# Patient Record
Sex: FEMALE | URBAN - METROPOLITAN AREA
[De-identification: names, ages, dates, MRNs, and addresses within clinical notes are randomized per-mention and may not be internally consistent; named-entity substitution may affect disease eponyms.]

---

## 2024-06-13 ENCOUNTER — CLAIMS CREATED FROM THE CLAIM WINDOW (OUTPATIENT)
Dept: URBAN - METROPOLITAN AREA MEDICAL CENTER 28 | Facility: MEDICAL CENTER | Age: 59
End: 2024-06-13
Payer: SELF-PAY

## 2024-06-13 DIAGNOSIS — K92.0 BLOODY EMESIS: ICD-10-CM

## 2024-06-13 DIAGNOSIS — D64.89 ANEMIA DUE TO OTHER CAUSE: ICD-10-CM

## 2024-06-13 DIAGNOSIS — K74.69 CIRRHOSIS, CRYPTOGENIC: ICD-10-CM

## 2024-06-13 DIAGNOSIS — D62 ABLA (ACUTE BLOOD LOSS ANEMIA): ICD-10-CM

## 2024-06-13 DIAGNOSIS — K20.80 ABSCESS OF ESOPHAGUS: ICD-10-CM

## 2024-06-13 DIAGNOSIS — R79.1 ABNORMAL BLEEDING TIME: ICD-10-CM

## 2024-06-13 PROCEDURE — 99255 IP/OBS CONSLTJ NEW/EST HI 80: CPT | Performed by: INTERNAL MEDICINE

## 2024-06-13 PROCEDURE — 43235 EGD DIAGNOSTIC BRUSH WASH: CPT | Performed by: INTERNAL MEDICINE

## 2024-06-14 ENCOUNTER — CLAIMS CREATED FROM THE CLAIM WINDOW (OUTPATIENT)
Dept: URBAN - METROPOLITAN AREA MEDICAL CENTER 28 | Facility: MEDICAL CENTER | Age: 59
End: 2024-06-14
Payer: SELF-PAY

## 2024-06-14 DIAGNOSIS — D62 ABLA (ACUTE BLOOD LOSS ANEMIA): ICD-10-CM

## 2024-06-14 DIAGNOSIS — K92.2 ACUTE GASTROINTESTINAL BLEEDING: ICD-10-CM

## 2024-06-14 DIAGNOSIS — I85.00 DOWNHILL ESOPHAGEAL VARICES: ICD-10-CM

## 2024-06-14 PROCEDURE — 43255 EGD CONTROL BLEEDING ANY: CPT | Performed by: INTERNAL MEDICINE

## 2024-06-14 PROCEDURE — 43235 EGD DIAGNOSTIC BRUSH WASH: CPT | Performed by: INTERNAL MEDICINE

## 2024-06-15 ENCOUNTER — CLAIMS CREATED FROM THE CLAIM WINDOW (OUTPATIENT)
Dept: URBAN - METROPOLITAN AREA MEDICAL CENTER 28 | Facility: MEDICAL CENTER | Age: 59
End: 2024-06-15
Payer: SELF-PAY

## 2024-06-15 DIAGNOSIS — K92.2 ACUTE GASTROINTESTINAL BLEEDING: ICD-10-CM

## 2024-06-15 PROCEDURE — 99232 SBSQ HOSP IP/OBS MODERATE 35: CPT | Performed by: INTERNAL MEDICINE

## 2024-06-16 ENCOUNTER — CLAIMS CREATED FROM THE CLAIM WINDOW (OUTPATIENT)
Dept: URBAN - METROPOLITAN AREA MEDICAL CENTER 28 | Facility: MEDICAL CENTER | Age: 59
End: 2024-06-16
Payer: SELF-PAY

## 2024-06-16 DIAGNOSIS — K92.2 ACUTE GASTROINTESTINAL BLEEDING: ICD-10-CM

## 2024-06-16 PROCEDURE — 99232 SBSQ HOSP IP/OBS MODERATE 35: CPT | Performed by: INTERNAL MEDICINE

## 2024-06-17 ENCOUNTER — CLAIMS CREATED FROM THE CLAIM WINDOW (OUTPATIENT)
Dept: URBAN - METROPOLITAN AREA MEDICAL CENTER 28 | Facility: MEDICAL CENTER | Age: 59
End: 2024-06-17
Payer: SELF-PAY

## 2024-06-17 DIAGNOSIS — K92.2 ACUTE GASTROINTESTINAL BLEEDING: ICD-10-CM

## 2024-06-17 DIAGNOSIS — D64.89 ANEMIA DUE TO OTHER CAUSE: ICD-10-CM

## 2024-06-17 DIAGNOSIS — F10.10 AA (ALCOHOL ABUSE): ICD-10-CM

## 2024-06-17 PROCEDURE — 99233 SBSQ HOSP IP/OBS HIGH 50: CPT | Performed by: INTERNAL MEDICINE

## 2024-06-18 ENCOUNTER — CLAIMS CREATED FROM THE CLAIM WINDOW (OUTPATIENT)
Dept: URBAN - METROPOLITAN AREA MEDICAL CENTER 28 | Facility: MEDICAL CENTER | Age: 59
End: 2024-06-18
Payer: SELF-PAY

## 2024-06-18 DIAGNOSIS — F10.10 AA (ALCOHOL ABUSE): ICD-10-CM

## 2024-06-18 DIAGNOSIS — K92.0 BLOODY EMESIS: ICD-10-CM

## 2024-06-18 DIAGNOSIS — K92.1 ACUTE MELENA: ICD-10-CM

## 2024-06-18 PROCEDURE — 99232 SBSQ HOSP IP/OBS MODERATE 35: CPT | Performed by: INTERNAL MEDICINE

## 2024-06-19 ENCOUNTER — CLAIMS CREATED FROM THE CLAIM WINDOW (OUTPATIENT)
Dept: URBAN - METROPOLITAN AREA MEDICAL CENTER 28 | Facility: MEDICAL CENTER | Age: 59
End: 2024-06-19
Payer: SELF-PAY

## 2024-06-19 DIAGNOSIS — K92.2 ACUTE GASTROINTESTINAL BLEEDING: ICD-10-CM

## 2024-06-19 DIAGNOSIS — R79.89 ABNORMAL BILIRUBIN TEST: ICD-10-CM

## 2024-06-19 DIAGNOSIS — R79.1 ABNORMAL BLEEDING TIME: ICD-10-CM

## 2024-06-19 DIAGNOSIS — K76.0 FATTY (CHANGE OF) LIVER: ICD-10-CM

## 2024-06-19 PROCEDURE — 99232 SBSQ HOSP IP/OBS MODERATE 35: CPT | Performed by: INTERNAL MEDICINE

## 2024-06-20 ENCOUNTER — CLAIMS CREATED FROM THE CLAIM WINDOW (OUTPATIENT)
Dept: URBAN - METROPOLITAN AREA MEDICAL CENTER 28 | Facility: MEDICAL CENTER | Age: 59
End: 2024-06-20
Payer: SELF-PAY

## 2024-06-20 DIAGNOSIS — R93.3 ABN FINDINGS-GI TRACT: ICD-10-CM

## 2024-06-20 DIAGNOSIS — K76.0 FATTY (CHANGE OF) LIVER: ICD-10-CM

## 2024-06-20 DIAGNOSIS — K92.2 ACUTE GASTROINTESTINAL BLEEDING: ICD-10-CM

## 2024-06-20 PROCEDURE — 99232 SBSQ HOSP IP/OBS MODERATE 35: CPT | Performed by: INTERNAL MEDICINE

## 2024-06-21 ENCOUNTER — CLAIMS CREATED FROM THE CLAIM WINDOW (OUTPATIENT)
Dept: URBAN - METROPOLITAN AREA MEDICAL CENTER 28 | Facility: MEDICAL CENTER | Age: 59
End: 2024-06-21

## 2024-06-21 PROCEDURE — 99232 SBSQ HOSP IP/OBS MODERATE 35: CPT | Performed by: PHYSICIAN ASSISTANT

## 2024-06-22 ENCOUNTER — CLAIMS CREATED FROM THE CLAIM WINDOW (OUTPATIENT)
Dept: URBAN - METROPOLITAN AREA MEDICAL CENTER 28 | Facility: MEDICAL CENTER | Age: 59
End: 2024-06-22

## 2024-06-22 PROCEDURE — 99232 SBSQ HOSP IP/OBS MODERATE 35: CPT | Performed by: INTERNAL MEDICINE

## 2024-06-23 ENCOUNTER — CLAIMS CREATED FROM THE CLAIM WINDOW (OUTPATIENT)
Dept: URBAN - METROPOLITAN AREA MEDICAL CENTER 28 | Facility: MEDICAL CENTER | Age: 59
End: 2024-06-23

## 2024-06-23 PROCEDURE — 99232 SBSQ HOSP IP/OBS MODERATE 35: CPT | Performed by: INTERNAL MEDICINE

## 2024-06-24 ENCOUNTER — CLAIMS CREATED FROM THE CLAIM WINDOW (OUTPATIENT)
Dept: URBAN - METROPOLITAN AREA MEDICAL CENTER 28 | Facility: MEDICAL CENTER | Age: 59
End: 2024-06-24

## 2024-06-24 PROCEDURE — 99232 SBSQ HOSP IP/OBS MODERATE 35: CPT | Performed by: PHYSICIAN ASSISTANT

## 2024-07-11 ENCOUNTER — OFFICE VISIT (OUTPATIENT)
Dept: URBAN - METROPOLITAN AREA CLINIC 98 | Facility: CLINIC | Age: 59
End: 2024-07-11
Payer: COMMERCIAL

## 2024-07-11 VITALS
DIASTOLIC BLOOD PRESSURE: 64 MMHG | HEART RATE: 79 BPM | BODY MASS INDEX: 28.16 KG/M2 | WEIGHT: 179.4 LBS | SYSTOLIC BLOOD PRESSURE: 102 MMHG | TEMPERATURE: 97.1 F | HEIGHT: 67 IN

## 2024-07-11 DIAGNOSIS — K76.82 HEPATIC ENCEPHALOPATHY: ICD-10-CM

## 2024-07-11 DIAGNOSIS — K70.31 ALCOHOLIC CIRRHOSIS OF LIVER WITH ASCITES: ICD-10-CM

## 2024-07-11 DIAGNOSIS — K76.6 PORTAL HYPERTENSION: ICD-10-CM

## 2024-07-11 DIAGNOSIS — K76.0 HEPATIC STEATOSIS: ICD-10-CM

## 2024-07-11 PROBLEM — 420054005: Status: ACTIVE | Noted: 2024-07-11

## 2024-07-11 PROCEDURE — 99214 OFFICE O/P EST MOD 30 MIN: CPT

## 2024-07-11 RX ORDER — LOSARTAN POTASSIUM 100 MG/1
TAKE 1 TABLET BY MOUTH IN THE MORNING TABLET, FILM COATED ORAL
Qty: 30 EACH | Refills: 5 | Status: ON HOLD | COMMUNITY

## 2024-07-11 RX ORDER — PROMETHAZINE HYDROCHLORIDE AND DEXTROMETHORPHAN HYDROBROMIDE 6.25; 15 MG/5ML; MG/5ML
TAKE 5 ML BY MOUTH 4 TIMES DAILY AS NEEDED FOR COUGH SOLUTION ORAL
Qty: 118 MILLILITER | Refills: 0 | Status: ON HOLD | COMMUNITY

## 2024-07-11 RX ORDER — AZITHROMYCIN 250 MG/1
TAKE 2 TABLETS BY MOUTH ON DAY 1, AND THEN TAKE 1 TABLET BY MOUTH ONCE A DAY ON DAY 2 THROUGH DAY 5 TABLET, FILM COATED ORAL
Qty: 6 EACH | Refills: 0 | Status: ON HOLD | COMMUNITY

## 2024-07-11 RX ORDER — BENZONATATE 100 MG/1
TAKE 1 CAPSULE BY MOUTH THREE TIMES DAILY AS NEEDED FOR COUGH CAPSULE ORAL
Qty: 21 EACH | Refills: 0 | Status: ON HOLD | COMMUNITY

## 2024-07-11 RX ORDER — SPIRONOLACTONE 50 MG/1
TABLET ORAL
Qty: 30 TABLET | Status: ACTIVE | COMMUNITY

## 2024-07-11 RX ORDER — FUROSEMIDE 20 MG/1
TABLET ORAL
Qty: 30 TABLET | Status: ACTIVE | COMMUNITY

## 2024-07-11 RX ORDER — NICOTINE 14 MG/24H
APPLY 1 PATCH TOPICALLY ONCE DAILY PATCH, EXTENDED RELEASE TRANSDERMAL
Qty: 28 EACH | Refills: 0 | Status: ON HOLD | COMMUNITY

## 2024-07-11 RX ORDER — ALBUTEROL SULFATE 90 UG/1
AEROSOL, METERED RESPIRATORY (INHALATION)
Qty: 18 UNSPECIFIED | Status: ON HOLD | COMMUNITY

## 2024-07-11 RX ORDER — PANTOPRAZOLE 40 MG/1
TAKE 1 TABLET BY MOUTH ONCE DAILY TABLET, DELAYED RELEASE ORAL
Qty: 30 EACH | Refills: 0 | Status: ON HOLD | COMMUNITY

## 2024-07-11 RX ORDER — NICOTINE 14 MG/24H
PATCH, EXTENDED RELEASE TRANSDERMAL
Qty: 28 PATCH | Status: ON HOLD | COMMUNITY

## 2024-07-11 RX ORDER — METHYLPREDNISOLONE 4 MG/1
TAKE BY MOUTH AS DIRECTED ON INSIDE OF PACKAGE TABLET ORAL
Qty: 21 EACH | Refills: 0 | Status: ON HOLD | COMMUNITY

## 2024-07-11 RX ORDER — LACTULOSE 10 G/15ML
TAKE 30 ML BY MOUTH TWICE DAILY INCREASE TO 3 TIMES DAILY IF HAVING FEWER THAN 2-3 BOWEL MOVEMENTS DAILY SOLUTION ORAL
Qty: 1000 MILLILITER | Refills: 0 | Status: ACTIVE | COMMUNITY

## 2024-07-11 RX ORDER — LORAZEPAM 0.5 MG/1
TAKE 1 TABLET BY MOUTH TWICE DAILY AS NEEDED FOR ANXIETY TABLET ORAL
Qty: 14 EACH | Refills: 0 | Status: ON HOLD | COMMUNITY

## 2024-07-11 RX ORDER — PREDNISONE 20 MG/1
TAKE 3 TABLETS BY MOUTH ONCE DAILY TABLET ORAL
Qty: 15 EACH | Refills: 0 | Status: ON HOLD | COMMUNITY

## 2024-07-11 RX ORDER — PROMETHAZINE HYDROCHLORIDE 25 MG/1
TAKE 1 TABLET BY MOUTH EVERY 4 HOURS AS NEEDED FOR NAUSEA AND VOMITING TABLET ORAL
Qty: 12 EACH | Refills: 0 | Status: ON HOLD | COMMUNITY

## 2024-07-11 RX ORDER — NADOLOL 20 MG/1
TAKE 1 TABLET BY MOUTH ONCE DAILY TABLET ORAL
Qty: 30 EACH | Refills: 0 | Status: ACTIVE | COMMUNITY

## 2024-07-11 RX ORDER — PANTOPRAZOLE 40 MG/1
TAKE 1 TABLET BY MOUTH TWICE DAILY TABLET, DELAYED RELEASE ORAL
Qty: 30 EACH | Refills: 0 | Status: ACTIVE | COMMUNITY

## 2024-07-11 RX ORDER — ALBUTEROL SULFATE 90 UG/1
INHALE 1 TO 2 PUFFS BY MOUTH EVERY 4 HOURS AS NEEDED FOR WHEEZING AEROSOL, METERED RESPIRATORY (INHALATION)
Qty: 18 GRAM | Refills: 0 | Status: ON HOLD | COMMUNITY

## 2024-07-11 RX ORDER — ONDANSETRON 4 MG/1
DISSOLVE 1 TABLET IN MOUTH EVERY 8 HOURS AS NEEDED FOR NAUSEA AND VOMITING TABLET, ORALLY DISINTEGRATING ORAL
Qty: 12 EACH | Refills: 0 | Status: ON HOLD | COMMUNITY

## 2024-07-11 NOTE — HPI-TODAY'S VISIT:
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

## 2024-07-12 ENCOUNTER — WEB ENCOUNTER (OUTPATIENT)
Dept: URBAN - METROPOLITAN AREA CLINIC 98 | Facility: CLINIC | Age: 59
End: 2024-07-12

## 2024-07-12 RX ORDER — LACTULOSE 10 G/15ML
15 ML SOLUTION ORAL
Qty: 450 ML | Refills: 5

## 2024-07-13 LAB
ALBUMIN: 2.6
ALKALINE PHOSPHATASE: 97
ALT (SGPT): 28
AST (SGOT): 60
BASO (ABSOLUTE): 0.1
BASOS: 2
BILIRUBIN, TOTAL: 3.5
BUN/CREATININE RATIO: 8
BUN: 6
CALCIUM: 8.8
CARBON DIOXIDE, TOTAL: 23
CHLORIDE: 105
CREATININE: 0.74
EGFR: 93
EOS (ABSOLUTE): 0.2
EOS: 5
GGT: 60
GLOBULIN, TOTAL: 4.5
GLUCOSE: 92
HEMATOCRIT: 30.8
HEMATOLOGY COMMENTS:: (no result)
HEMOGLOBIN: 10.7
IMMATURE CELLS: (no result)
IMMATURE GRANS (ABS): (no result)
IMMATURE GRANULOCYTES: (no result)
INR: 1.5
LYMPHS (ABSOLUTE): 1.1
LYMPHS: 28
MAGNESIUM: 1.5
MCH: 32.1
MCHC: 34.7
MCV: 93
MONOCYTES(ABSOLUTE): 0.3
MONOCYTES: 7
NEUTROPHILS (ABSOLUTE): 2.3
NEUTROPHILS: 58
NRBC: 1
PLATELETS: 97
POTASSIUM: 4.1
PROTEIN, TOTAL: 7.1
PROTHROMBIN TIME: 15.6
RBC: 3.33
RDW: 13.6
SODIUM: 138
WBC: 3.9

## 2024-07-14 ENCOUNTER — WEB ENCOUNTER (OUTPATIENT)
Dept: URBAN - METROPOLITAN AREA CLINIC 98 | Facility: CLINIC | Age: 59
End: 2024-07-14

## 2024-07-18 PROBLEM — 197321007: Status: ACTIVE | Noted: 2024-07-18

## 2024-07-18 PROBLEM — 302215000: Status: ACTIVE | Noted: 2024-07-18

## 2024-07-18 PROBLEM — 34742003: Status: ACTIVE | Noted: 2024-07-18

## 2024-07-21 ENCOUNTER — WEB ENCOUNTER (OUTPATIENT)
Dept: URBAN - METROPOLITAN AREA CLINIC 98 | Facility: CLINIC | Age: 59
End: 2024-07-21

## 2024-07-22 ENCOUNTER — WEB ENCOUNTER (OUTPATIENT)
Dept: URBAN - METROPOLITAN AREA CLINIC 98 | Facility: CLINIC | Age: 59
End: 2024-07-22

## 2024-07-22 RX ORDER — LACTULOSE 10 G/15ML
15 ML SOLUTION ORAL
Qty: 450 ML | Refills: 5
End: 2025-01-19

## 2024-07-23 ENCOUNTER — WEB ENCOUNTER (OUTPATIENT)
Dept: URBAN - METROPOLITAN AREA CLINIC 98 | Facility: CLINIC | Age: 59
End: 2024-07-23

## 2024-07-24 ENCOUNTER — DASHBOARD ENCOUNTERS (OUTPATIENT)
Age: 59
End: 2024-07-24

## 2024-07-25 ENCOUNTER — OFFICE VISIT (OUTPATIENT)
Dept: URBAN - METROPOLITAN AREA CLINIC 98 | Facility: CLINIC | Age: 59
End: 2024-07-25

## 2024-07-25 RX ORDER — PANTOPRAZOLE 40 MG/1
TAKE 1 TABLET BY MOUTH ONCE DAILY TABLET, DELAYED RELEASE ORAL
Qty: 30 EACH | Refills: 0 | COMMUNITY

## 2024-07-25 RX ORDER — PROMETHAZINE HYDROCHLORIDE 25 MG/1
TAKE 1 TABLET BY MOUTH EVERY 4 HOURS AS NEEDED FOR NAUSEA AND VOMITING TABLET ORAL
Qty: 12 EACH | Refills: 0 | COMMUNITY

## 2024-07-25 RX ORDER — LACTULOSE 10 G/15ML
15 ML SOLUTION ORAL
Qty: 450 ML | Refills: 5 | Status: ACTIVE | COMMUNITY
End: 2025-01-19

## 2024-07-25 RX ORDER — BENZONATATE 100 MG/1
TAKE 1 CAPSULE BY MOUTH THREE TIMES DAILY AS NEEDED FOR COUGH CAPSULE ORAL
Qty: 21 EACH | Refills: 0 | COMMUNITY

## 2024-07-25 RX ORDER — METHYLPREDNISOLONE 4 MG/1
TAKE BY MOUTH AS DIRECTED ON INSIDE OF PACKAGE TABLET ORAL
Qty: 21 EACH | Refills: 0 | COMMUNITY

## 2024-07-25 RX ORDER — LORAZEPAM 0.5 MG/1
TAKE 1 TABLET BY MOUTH TWICE DAILY AS NEEDED FOR ANXIETY TABLET ORAL
Qty: 14 EACH | Refills: 0 | COMMUNITY

## 2024-07-25 RX ORDER — LOSARTAN POTASSIUM 100 MG/1
TAKE 1 TABLET BY MOUTH IN THE MORNING TABLET, FILM COATED ORAL
Qty: 30 EACH | Refills: 5 | COMMUNITY

## 2024-07-25 RX ORDER — NICOTINE 14 MG/24H
PATCH, EXTENDED RELEASE TRANSDERMAL
Qty: 28 PATCH | COMMUNITY

## 2024-07-25 RX ORDER — PROMETHAZINE HYDROCHLORIDE AND DEXTROMETHORPHAN HYDROBROMIDE 6.25; 15 MG/5ML; MG/5ML
TAKE 5 ML BY MOUTH 4 TIMES DAILY AS NEEDED FOR COUGH SOLUTION ORAL
Qty: 118 MILLILITER | Refills: 0 | COMMUNITY

## 2024-07-25 RX ORDER — FUROSEMIDE 20 MG/1
TABLET ORAL
Qty: 30 TABLET | Status: ACTIVE | COMMUNITY

## 2024-07-25 RX ORDER — PREDNISONE 20 MG/1
TAKE 3 TABLETS BY MOUTH ONCE DAILY TABLET ORAL
Qty: 15 EACH | Refills: 0 | COMMUNITY

## 2024-07-25 RX ORDER — AZITHROMYCIN 250 MG/1
TAKE 2 TABLETS BY MOUTH ON DAY 1, AND THEN TAKE 1 TABLET BY MOUTH ONCE A DAY ON DAY 2 THROUGH DAY 5 TABLET, FILM COATED ORAL
Qty: 6 EACH | Refills: 0 | COMMUNITY

## 2024-07-25 RX ORDER — ALBUTEROL SULFATE 90 UG/1
INHALE 1 TO 2 PUFFS BY MOUTH EVERY 4 HOURS AS NEEDED FOR WHEEZING AEROSOL, METERED RESPIRATORY (INHALATION)
Qty: 18 GRAM | Refills: 0 | COMMUNITY

## 2024-07-25 RX ORDER — NICOTINE 14 MG/24H
APPLY 1 PATCH TOPICALLY ONCE DAILY PATCH, EXTENDED RELEASE TRANSDERMAL
Qty: 28 EACH | Refills: 0 | COMMUNITY

## 2024-07-25 RX ORDER — SPIRONOLACTONE 50 MG/1
TABLET ORAL
Qty: 30 TABLET | Status: ACTIVE | COMMUNITY

## 2024-07-25 RX ORDER — NADOLOL 20 MG/1
TAKE 1 TABLET BY MOUTH ONCE DAILY TABLET ORAL
Qty: 30 EACH | Refills: 0 | Status: ACTIVE | COMMUNITY

## 2024-07-25 RX ORDER — ALBUTEROL SULFATE 90 UG/1
AEROSOL, METERED RESPIRATORY (INHALATION)
Qty: 18 UNSPECIFIED | COMMUNITY

## 2024-07-25 RX ORDER — ONDANSETRON 4 MG/1
DISSOLVE 1 TABLET IN MOUTH EVERY 8 HOURS AS NEEDED FOR NAUSEA AND VOMITING TABLET, ORALLY DISINTEGRATING ORAL
Qty: 12 EACH | Refills: 0 | COMMUNITY

## 2024-07-25 RX ORDER — PANTOPRAZOLE 40 MG/1
TAKE 1 TABLET BY MOUTH TWICE DAILY TABLET, DELAYED RELEASE ORAL
Qty: 30 EACH | Refills: 0 | Status: ACTIVE | COMMUNITY

## 2024-08-09 ENCOUNTER — OFFICE VISIT (OUTPATIENT)
Dept: URBAN - METROPOLITAN AREA CLINIC 98 | Facility: CLINIC | Age: 59
End: 2024-08-09
Payer: COMMERCIAL

## 2024-08-09 ENCOUNTER — LAB OUTSIDE AN ENCOUNTER (OUTPATIENT)
Dept: URBAN - METROPOLITAN AREA CLINIC 98 | Facility: CLINIC | Age: 59
End: 2024-08-09

## 2024-08-09 VITALS
HEART RATE: 76 BPM | SYSTOLIC BLOOD PRESSURE: 107 MMHG | WEIGHT: 160 LBS | BODY MASS INDEX: 25.11 KG/M2 | HEIGHT: 67 IN | DIASTOLIC BLOOD PRESSURE: 76 MMHG | TEMPERATURE: 97.3 F

## 2024-08-09 DIAGNOSIS — I85.10 SECONDARY ESOPHAGEAL VARICES WITHOUT BLEEDING: ICD-10-CM

## 2024-08-09 DIAGNOSIS — K70.31 ALCOHOLIC CIRRHOSIS OF LIVER WITH ASCITES: ICD-10-CM

## 2024-08-09 DIAGNOSIS — K76.82 HEPATIC ENCEPHALOPATHY: ICD-10-CM

## 2024-08-09 DIAGNOSIS — K76.0 HEPATIC STEATOSIS: ICD-10-CM

## 2024-08-09 PROCEDURE — 99214 OFFICE O/P EST MOD 30 MIN: CPT

## 2024-08-09 RX ORDER — ONDANSETRON 4 MG/1
DISSOLVE 1 TABLET IN MOUTH EVERY 8 HOURS AS NEEDED FOR NAUSEA AND VOMITING TABLET, ORALLY DISINTEGRATING ORAL
Qty: 12 EACH | Refills: 0 | COMMUNITY

## 2024-08-09 RX ORDER — SPIRONOLACTONE 50 MG/1
TABLET ORAL
Qty: 30 TABLET | Status: ACTIVE | COMMUNITY

## 2024-08-09 RX ORDER — FUROSEMIDE 20 MG/1
1 TABLET TABLET ORAL ONCE A DAY
Qty: 30 | Refills: 1 | OUTPATIENT
Start: 2024-08-09 | End: 2024-10-08

## 2024-08-09 RX ORDER — PROMETHAZINE HYDROCHLORIDE AND DEXTROMETHORPHAN HYDROBROMIDE 6.25; 15 MG/5ML; MG/5ML
TAKE 5 ML BY MOUTH 4 TIMES DAILY AS NEEDED FOR COUGH SOLUTION ORAL
Qty: 118 MILLILITER | Refills: 0 | COMMUNITY

## 2024-08-09 RX ORDER — NADOLOL 20 MG/1
TAKE 1 TABLET BY MOUTH ONCE DAILY TABLET ORAL
Qty: 30 EACH | Refills: 0 | Status: ACTIVE | COMMUNITY

## 2024-08-09 RX ORDER — LACTULOSE 10 G/15ML
15 ML SOLUTION ORAL
Qty: 450 ML | Refills: 5 | Status: ACTIVE | COMMUNITY
End: 2025-01-19

## 2024-08-09 RX ORDER — LOSARTAN POTASSIUM 100 MG/1
TAKE 1 TABLET BY MOUTH IN THE MORNING TABLET, FILM COATED ORAL
Qty: 30 EACH | Refills: 5 | COMMUNITY

## 2024-08-09 RX ORDER — ALBUTEROL SULFATE 90 UG/1
AEROSOL, METERED RESPIRATORY (INHALATION)
Qty: 18 UNSPECIFIED | COMMUNITY

## 2024-08-09 RX ORDER — PANTOPRAZOLE 40 MG/1
TAKE 1 TABLET BY MOUTH ONCE DAILY TABLET, DELAYED RELEASE ORAL
Qty: 30 EACH | Refills: 0 | COMMUNITY

## 2024-08-09 RX ORDER — SPIRONOLACTONE 50 MG/1
1 TABLET TABLET, FILM COATED ORAL ONCE A DAY
Qty: 90 TABLET | Refills: 1 | OUTPATIENT
Start: 2024-08-09 | End: 2025-02-04

## 2024-08-09 RX ORDER — NICOTINE 14 MG/24H
APPLY 1 PATCH TOPICALLY ONCE DAILY PATCH, EXTENDED RELEASE TRANSDERMAL
Qty: 28 EACH | Refills: 0 | COMMUNITY

## 2024-08-09 RX ORDER — PANTOPRAZOLE 40 MG/1
TAKE 1 TABLET BY MOUTH TWICE DAILY TABLET, DELAYED RELEASE ORAL
Qty: 30 EACH | Refills: 0 | Status: ACTIVE | COMMUNITY

## 2024-08-09 RX ORDER — LACTULOSE 10 G/15ML
15 ML SOLUTION ORAL
Qty: 1350 ML | Refills: 5 | OUTPATIENT
Start: 2024-08-09 | End: 2025-02-04

## 2024-08-09 RX ORDER — HYDROXYZINE HYDROCHLORIDE 25 MG/1
1 TABLET AS NEEDED TABLET, FILM COATED ORAL ONCE A DAY
Status: ACTIVE | COMMUNITY
Start: 2024-07-29

## 2024-08-09 RX ORDER — ALBUTEROL SULFATE 90 UG/1
INHALE 1 TO 2 PUFFS BY MOUTH EVERY 4 HOURS AS NEEDED FOR WHEEZING AEROSOL, METERED RESPIRATORY (INHALATION)
Qty: 18 GRAM | Refills: 0 | COMMUNITY

## 2024-08-09 RX ORDER — AZITHROMYCIN 250 MG/1
TAKE 2 TABLETS BY MOUTH ON DAY 1, AND THEN TAKE 1 TABLET BY MOUTH ONCE A DAY ON DAY 2 THROUGH DAY 5 TABLET, FILM COATED ORAL
Qty: 6 EACH | Refills: 0 | COMMUNITY

## 2024-08-09 RX ORDER — BENZONATATE 100 MG/1
TAKE 1 CAPSULE BY MOUTH THREE TIMES DAILY AS NEEDED FOR COUGH CAPSULE ORAL
Qty: 21 EACH | Refills: 0 | COMMUNITY

## 2024-08-09 RX ORDER — LORAZEPAM 0.5 MG/1
TAKE 1 TABLET BY MOUTH TWICE DAILY AS NEEDED FOR ANXIETY TABLET ORAL
Qty: 14 EACH | Refills: 0 | COMMUNITY

## 2024-08-09 RX ORDER — METHYLPREDNISOLONE 4 MG/1
TAKE BY MOUTH AS DIRECTED ON INSIDE OF PACKAGE TABLET ORAL
Qty: 21 EACH | Refills: 0 | COMMUNITY

## 2024-08-09 RX ORDER — PANTOPRAZOLE SODIUM 40 MG/1
1 TABLET TABLET, DELAYED RELEASE ORAL TWICE A DAY
Qty: 60 TABLET | Refills: 0 | OUTPATIENT
Start: 2024-08-09

## 2024-08-09 RX ORDER — PROMETHAZINE HYDROCHLORIDE 25 MG/1
TAKE 1 TABLET BY MOUTH EVERY 4 HOURS AS NEEDED FOR NAUSEA AND VOMITING TABLET ORAL
Qty: 12 EACH | Refills: 0 | COMMUNITY

## 2024-08-09 RX ORDER — NADOLOL 20 MG/1
1 TABLET TABLET ORAL ONCE A DAY
Qty: 90 TABLET | Refills: 1 | OUTPATIENT
Start: 2024-08-09

## 2024-08-09 RX ORDER — PREDNISONE 20 MG/1
TAKE 3 TABLETS BY MOUTH ONCE DAILY TABLET ORAL
Qty: 15 EACH | Refills: 0 | COMMUNITY

## 2024-08-09 RX ORDER — NICOTINE 14 MG/24H
PATCH, EXTENDED RELEASE TRANSDERMAL
Qty: 28 PATCH | COMMUNITY

## 2024-08-09 RX ORDER — FUROSEMIDE 20 MG/1
TABLET ORAL
Qty: 30 TABLET | Status: ACTIVE | COMMUNITY

## 2024-08-09 RX ORDER — RIFAXIMIN 550 MG/1
1 TABLET TABLET ORAL TWICE A DAY
Qty: 180 TABLET | Refills: 3 | OUTPATIENT
Start: 2024-08-09

## 2024-08-09 NOTE — PHYSICAL EXAM HENT:
Head, normocephalic, atraumatic, Face, Face within normal limits, Ears, External ears within normal limits
How Severe Are They?: moderate
Is This A New Presentation, Or A Follow-Up?: Actinic Keratosis

## 2024-08-09 NOTE — HPI-TODAY'S VISIT:
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 For HEMOSTASIS, hemostatic spray deployed. No specimens collected. - EGD 6/14/24- examined duodenum normal with clotted blood and bile, large amount of clots and blood in gastric body, trace varices lower esophagus, not bleeding, esophagitis with no bleeding, small hiatal hernia. No gastric varices. Few superfiicial non bleeding antral ulcerations. No active bleeding noted during exam. No specimens collected. - No ETOH since hospital discharge - Takes vitamin d, spironolactone 50 qd, furosemide 20 QD, lactulose 30 ml bid, [id number] qd, pantoprazole 40 bid - Labs 7/5/2024- hgb 9.2, mcv 97, plt 78, na 141, k 3.8, cr 0.7, albumin 2.4, tbil 3.5, alp 111, ast 65, alt 29, mag 1.8,  8/9/2024- Laura Jewell NP - 58 yo female here for 1 month follow-up alcohol induced cirrhosis - Still getting very tired - Taking pantoprazole 40 bid, nadolol 20 qd, furosemide 20 mg qd, hydroxyzine 25 qd, spironolactone 50 qd, lactulose bid - Needs to start xifaxan having insurance issues - Has changed diet and makes her feel better - Only has BM every few days- needs to increase lactulose - No ETOH 6/14/2024 - Has not started AA at this time

## 2024-08-11 PROBLEM — 14223005: Status: ACTIVE | Noted: 2024-08-09

## 2024-08-11 PROBLEM — 74474003: Status: ACTIVE | Noted: 2024-08-09

## 2024-08-12 ENCOUNTER — TELEPHONE ENCOUNTER (OUTPATIENT)
Dept: URBAN - METROPOLITAN AREA CLINIC 98 | Facility: CLINIC | Age: 59
End: 2024-08-12

## 2024-08-19 ENCOUNTER — TELEPHONE ENCOUNTER (OUTPATIENT)
Dept: URBAN - METROPOLITAN AREA CLINIC 98 | Facility: CLINIC | Age: 59
End: 2024-08-19

## 2024-08-19 LAB
ALBUMIN: 3.3
ALKALINE PHOSPHATASE: 108
ALT (SGPT): 31
AST (SGOT): 49
BASO (ABSOLUTE): 0
BASOS: 1
BILIRUBIN, TOTAL: 0.3
BUN/CREATININE RATIO: 10
BUN: 9
CALCIUM: 9.6
CARBON DIOXIDE, TOTAL: 21
CHLORIDE: 100
CREATININE: 0.92
EGFR: 72
EOS (ABSOLUTE): 0.2
EOS: 4
GLOBULIN, TOTAL: 4.3
GLUCOSE: 83
HCV LOG10: (no result)
HEMATOCRIT: 35.3
HEMATOLOGY COMMENTS:: (no result)
HEMOGLOBIN: 12.5
HEPATITIS C QUANTITATION: (no result)
IMMATURE CELLS: (no result)
IMMATURE GRANS (ABS): 0
IMMATURE GRANULOCYTES: 0
INR: 1.4
LYMPHS (ABSOLUTE): 1.6
LYMPHS: 31
MAGNESIUM: 1.8
MCH: 32.2
MCHC: 35.4
MCV: 91
MONOCYTES(ABSOLUTE): 0.5
MONOCYTES: 10
NEUTROPHILS (ABSOLUTE): 2.8
NEUTROPHILS: 54
NRBC: (no result)
PLATELETS: 90
POTASSIUM: 4.8
PROTEIN, TOTAL: 7.6
PROTHROMBIN TIME: 14.4
RBC: 3.88
RDW: 16.1
SODIUM: 134
TEST INFORMATION:: (no result)
WBC: 5

## 2024-09-04 ENCOUNTER — WEB ENCOUNTER (OUTPATIENT)
Dept: URBAN - METROPOLITAN AREA CLINIC 98 | Facility: CLINIC | Age: 59
End: 2024-09-04

## 2024-09-04 RX ORDER — NADOLOL 20 MG/1
1 TABLET TABLET ORAL ONCE A DAY
Qty: 90 TABLET | Refills: 1
Start: 2024-08-09

## 2024-09-09 ENCOUNTER — LAB OUTSIDE AN ENCOUNTER (OUTPATIENT)
Dept: URBAN - METROPOLITAN AREA CLINIC 98 | Facility: CLINIC | Age: 59
End: 2024-09-09

## 2024-09-19 ENCOUNTER — OFFICE VISIT (OUTPATIENT)
Dept: URBAN - METROPOLITAN AREA MEDICAL CENTER 28 | Facility: MEDICAL CENTER | Age: 59
End: 2024-09-19
Payer: COMMERCIAL

## 2024-09-19 ENCOUNTER — TELEPHONE ENCOUNTER (OUTPATIENT)
Dept: URBAN - METROPOLITAN AREA CLINIC 98 | Facility: CLINIC | Age: 59
End: 2024-09-19

## 2024-09-19 DIAGNOSIS — K76.6 GASTROPATHY, PORTAL HYPERTENSIVE: ICD-10-CM

## 2024-09-19 DIAGNOSIS — K29.50 CHRONIC GASTRITIS: ICD-10-CM

## 2024-09-19 DIAGNOSIS — I85.10 SECONDARY ESOPHAGEAL VARICES WITHOUT BLEEDING: ICD-10-CM

## 2024-09-19 DIAGNOSIS — K74.60 CIRRHOSIS: ICD-10-CM

## 2024-09-19 PROCEDURE — 43239 EGD BIOPSY SINGLE/MULTIPLE: CPT | Performed by: INTERNAL MEDICINE

## 2024-09-19 RX ORDER — LACTULOSE 10 G/15ML
15 ML SOLUTION ORAL
Qty: 1350 ML | Refills: 5 | Status: ACTIVE | COMMUNITY
Start: 2024-08-09 | End: 2025-02-04

## 2024-09-19 RX ORDER — RIFAXIMIN 550 MG/1
1 TABLET TABLET ORAL TWICE A DAY
Qty: 180 TABLET | Refills: 3 | Status: ACTIVE | COMMUNITY
Start: 2024-08-09

## 2024-09-19 RX ORDER — PREDNISONE 20 MG/1
TAKE 3 TABLETS BY MOUTH ONCE DAILY TABLET ORAL
Qty: 15 EACH | Refills: 0 | COMMUNITY

## 2024-09-19 RX ORDER — ALBUTEROL SULFATE 90 UG/1
INHALE 1 TO 2 PUFFS BY MOUTH EVERY 4 HOURS AS NEEDED FOR WHEEZING AEROSOL, METERED RESPIRATORY (INHALATION)
Qty: 18 GRAM | Refills: 0 | COMMUNITY

## 2024-09-19 RX ORDER — NADOLOL 20 MG/1
1 TABLET TABLET ORAL ONCE A DAY
Qty: 90 TABLET | Refills: 1 | Status: ACTIVE | COMMUNITY
Start: 2024-08-09

## 2024-09-19 RX ORDER — SPIRONOLACTONE 50 MG/1
TABLET ORAL
Qty: 30 TABLET | Status: ACTIVE | COMMUNITY

## 2024-09-19 RX ORDER — ONDANSETRON 4 MG/1
DISSOLVE 1 TABLET IN MOUTH EVERY 8 HOURS AS NEEDED FOR NAUSEA AND VOMITING TABLET, ORALLY DISINTEGRATING ORAL
Qty: 12 EACH | Refills: 0 | COMMUNITY

## 2024-09-19 RX ORDER — PANTOPRAZOLE SODIUM 40 MG/1
1 TABLET TABLET, DELAYED RELEASE ORAL TWICE A DAY
Qty: 60 TABLET | Refills: 0 | Status: ACTIVE | COMMUNITY
Start: 2024-08-09

## 2024-09-19 RX ORDER — PANTOPRAZOLE 40 MG/1
TAKE 1 TABLET BY MOUTH ONCE DAILY TABLET, DELAYED RELEASE ORAL
Qty: 30 EACH | Refills: 0 | COMMUNITY

## 2024-09-19 RX ORDER — PROMETHAZINE HYDROCHLORIDE AND DEXTROMETHORPHAN HYDROBROMIDE 6.25; 15 MG/5ML; MG/5ML
TAKE 5 ML BY MOUTH 4 TIMES DAILY AS NEEDED FOR COUGH SOLUTION ORAL
Qty: 118 MILLILITER | Refills: 0 | COMMUNITY

## 2024-09-19 RX ORDER — LORAZEPAM 0.5 MG/1
TAKE 1 TABLET BY MOUTH TWICE DAILY AS NEEDED FOR ANXIETY TABLET ORAL
Qty: 14 EACH | Refills: 0 | COMMUNITY

## 2024-09-19 RX ORDER — FUROSEMIDE 20 MG/1
1 TABLET TABLET ORAL ONCE A DAY
Qty: 30 | Refills: 1 | Status: ACTIVE | COMMUNITY
Start: 2024-08-09 | End: 2024-10-08

## 2024-09-19 RX ORDER — LOSARTAN POTASSIUM 100 MG/1
TAKE 1 TABLET BY MOUTH IN THE MORNING TABLET, FILM COATED ORAL
Qty: 30 EACH | Refills: 5 | COMMUNITY

## 2024-09-19 RX ORDER — BENZONATATE 100 MG/1
TAKE 1 CAPSULE BY MOUTH THREE TIMES DAILY AS NEEDED FOR COUGH CAPSULE ORAL
Qty: 21 EACH | Refills: 0 | COMMUNITY

## 2024-09-19 RX ORDER — LACTULOSE 10 G/15ML
15 ML SOLUTION ORAL
Qty: 450 ML | Refills: 5 | Status: ACTIVE | COMMUNITY
End: 2025-01-19

## 2024-09-19 RX ORDER — HYDROXYZINE HYDROCHLORIDE 25 MG/1
1 TABLET AS NEEDED TABLET, FILM COATED ORAL ONCE A DAY
Status: ACTIVE | COMMUNITY
Start: 2024-07-29

## 2024-09-19 RX ORDER — PANTOPRAZOLE 40 MG/1
TAKE 1 TABLET BY MOUTH TWICE DAILY TABLET, DELAYED RELEASE ORAL
Qty: 30 EACH | Refills: 0 | Status: ACTIVE | COMMUNITY

## 2024-09-19 RX ORDER — METHYLPREDNISOLONE 4 MG/1
TAKE BY MOUTH AS DIRECTED ON INSIDE OF PACKAGE TABLET ORAL
Qty: 21 EACH | Refills: 0 | COMMUNITY

## 2024-09-19 RX ORDER — FUROSEMIDE 20 MG/1
TABLET ORAL
Qty: 30 TABLET | Status: ACTIVE | COMMUNITY

## 2024-09-19 RX ORDER — NICOTINE 14 MG/24H
APPLY 1 PATCH TOPICALLY ONCE DAILY PATCH, EXTENDED RELEASE TRANSDERMAL
Qty: 28 EACH | Refills: 0 | COMMUNITY

## 2024-09-19 RX ORDER — NADOLOL 20 MG/1
TAKE 1 TABLET BY MOUTH ONCE DAILY TABLET ORAL
Qty: 30 EACH | Refills: 0 | Status: ACTIVE | COMMUNITY

## 2024-09-19 RX ORDER — PROMETHAZINE HYDROCHLORIDE 25 MG/1
TAKE 1 TABLET BY MOUTH EVERY 4 HOURS AS NEEDED FOR NAUSEA AND VOMITING TABLET ORAL
Qty: 12 EACH | Refills: 0 | COMMUNITY

## 2024-09-19 RX ORDER — NICOTINE 14 MG/24H
PATCH, EXTENDED RELEASE TRANSDERMAL
Qty: 28 PATCH | COMMUNITY

## 2024-09-19 RX ORDER — AZITHROMYCIN 250 MG/1
TAKE 2 TABLETS BY MOUTH ON DAY 1, AND THEN TAKE 1 TABLET BY MOUTH ONCE A DAY ON DAY 2 THROUGH DAY 5 TABLET, FILM COATED ORAL
Qty: 6 EACH | Refills: 0 | COMMUNITY

## 2024-09-19 RX ORDER — SPIRONOLACTONE 50 MG/1
1 TABLET TABLET, FILM COATED ORAL ONCE A DAY
Qty: 90 TABLET | Refills: 1 | Status: ACTIVE | COMMUNITY
Start: 2024-08-09 | End: 2025-02-04

## 2024-09-19 RX ORDER — ALBUTEROL SULFATE 90 UG/1
AEROSOL, METERED RESPIRATORY (INHALATION)
Qty: 18 UNSPECIFIED | COMMUNITY

## 2024-09-23 ENCOUNTER — TELEPHONE ENCOUNTER (OUTPATIENT)
Dept: URBAN - METROPOLITAN AREA CLINIC 98 | Facility: CLINIC | Age: 59
End: 2024-09-23

## 2024-10-17 ENCOUNTER — LAB OUTSIDE AN ENCOUNTER (OUTPATIENT)
Dept: URBAN - METROPOLITAN AREA CLINIC 98 | Facility: CLINIC | Age: 59
End: 2024-10-17

## 2024-10-18 LAB
ALBUMIN: 3.6
ALKALINE PHOSPHATASE: 216
ALT (SGPT): 31
AST (SGOT): 57
BASO (ABSOLUTE): 0
BASOS: 1
BILIRUBIN, TOTAL: 4.7
BUN/CREATININE RATIO: 17
BUN: 16
CALCIUM: 9.8
CARBON DIOXIDE, TOTAL: 21
CHLORIDE: 100
CREATININE: 0.94
EGFR: 70
EOS (ABSOLUTE): 0.1
EOS: 2
GLOBULIN, TOTAL: 4.5
GLUCOSE: 98
HEMATOCRIT: 35.9
HEMATOLOGY COMMENTS:: (no result)
HEMOGLOBIN: 12.6
IMMATURE CELLS: (no result)
IMMATURE GRANS (ABS): 0
IMMATURE GRANULOCYTES: 0
INR: 1.3
LYMPHS (ABSOLUTE): 1.3
LYMPHS: 36
MAGNESIUM: 1.7
MCH: 32.4
MCHC: 35.1
MCV: 92
MONOCYTES(ABSOLUTE): 0.4
MONOCYTES: 11
NEUTROPHILS (ABSOLUTE): 1.9
NEUTROPHILS: 50
NRBC: (no result)
PLATELETS: 78
POTASSIUM: 4.3
PROTEIN, TOTAL: 8.1
PROTHROMBIN TIME: 14.6
RBC: 3.89
RDW: 15.8
SODIUM: 136
WBC: 3.7

## 2024-10-21 ENCOUNTER — OFFICE VISIT (OUTPATIENT)
Dept: URBAN - METROPOLITAN AREA CLINIC 98 | Facility: CLINIC | Age: 59
End: 2024-10-21
Payer: COMMERCIAL

## 2024-10-21 ENCOUNTER — ERX REFILL RESPONSE (OUTPATIENT)
Dept: URBAN - METROPOLITAN AREA CLINIC 98 | Facility: CLINIC | Age: 59
End: 2024-10-21

## 2024-10-21 VITALS
WEIGHT: 161.6 LBS | SYSTOLIC BLOOD PRESSURE: 124 MMHG | TEMPERATURE: 97.2 F | BODY MASS INDEX: 25.36 KG/M2 | HEART RATE: 83 BPM | DIASTOLIC BLOOD PRESSURE: 86 MMHG | HEIGHT: 67 IN

## 2024-10-21 DIAGNOSIS — K70.31 ALCOHOLIC CIRRHOSIS OF LIVER WITH ASCITES: ICD-10-CM

## 2024-10-21 DIAGNOSIS — I85.10 SECONDARY ESOPHAGEAL VARICES WITHOUT BLEEDING: ICD-10-CM

## 2024-10-21 DIAGNOSIS — D64.9 ANEMIA, UNSPECIFIED TYPE: ICD-10-CM

## 2024-10-21 DIAGNOSIS — D69.6 THROMBOCYTOPENIA: ICD-10-CM

## 2024-10-21 DIAGNOSIS — K76.82 HEPATIC ENCEPHALOPATHY: ICD-10-CM

## 2024-10-21 DIAGNOSIS — K76.6 PORTAL HYPERTENSION: ICD-10-CM

## 2024-10-21 DIAGNOSIS — R76.8 HCV ANTIBODY POSITIVE: ICD-10-CM

## 2024-10-21 DIAGNOSIS — K76.0 HEPATIC STEATOSIS: ICD-10-CM

## 2024-10-21 DIAGNOSIS — K25.4 GASTROINTESTINAL HEMORRHAGE ASSOCIATED WITH GASTRIC ULCER: ICD-10-CM

## 2024-10-21 PROCEDURE — 99214 OFFICE O/P EST MOD 30 MIN: CPT

## 2024-10-21 RX ORDER — FUROSEMIDE 20 MG/1
TABLET ORAL
Qty: 30 TABLET | Status: ACTIVE | COMMUNITY

## 2024-10-21 RX ORDER — BENZONATATE 100 MG/1
TAKE 1 CAPSULE BY MOUTH THREE TIMES DAILY AS NEEDED FOR COUGH CAPSULE ORAL
Qty: 21 EACH | Refills: 0 | COMMUNITY

## 2024-10-21 RX ORDER — PROMETHAZINE HYDROCHLORIDE 25 MG/1
TAKE 1 TABLET BY MOUTH EVERY 4 HOURS AS NEEDED FOR NAUSEA AND VOMITING TABLET ORAL
Qty: 12 EACH | Refills: 0 | COMMUNITY

## 2024-10-21 RX ORDER — LACTULOSE 10 G/15ML
15 ML SOLUTION ORAL
Qty: 450 ML | Refills: 5 | Status: ACTIVE | COMMUNITY
End: 2025-01-19

## 2024-10-21 RX ORDER — NADOLOL 20 MG/1
1 TABLET TABLET ORAL ONCE A DAY
Qty: 90 TABLET | Refills: 1 | Status: ACTIVE | COMMUNITY
Start: 2024-08-09

## 2024-10-21 RX ORDER — LORAZEPAM 0.5 MG/1
TAKE 1 TABLET BY MOUTH TWICE DAILY AS NEEDED FOR ANXIETY TABLET ORAL
Qty: 14 EACH | Refills: 0 | COMMUNITY

## 2024-10-21 RX ORDER — PANTOPRAZOLE SODIUM 40 MG/1
1 TABLET TABLET, DELAYED RELEASE ORAL TWICE A DAY
Qty: 60 TABLET | Refills: 0 | Status: ACTIVE | COMMUNITY
Start: 2024-08-09

## 2024-10-21 RX ORDER — PREDNISONE 20 MG/1
TAKE 3 TABLETS BY MOUTH ONCE DAILY TABLET ORAL
Qty: 15 EACH | Refills: 0 | COMMUNITY

## 2024-10-21 RX ORDER — PROMETHAZINE HYDROCHLORIDE AND DEXTROMETHORPHAN HYDROBROMIDE 6.25; 15 MG/5ML; MG/5ML
TAKE 5 ML BY MOUTH 4 TIMES DAILY AS NEEDED FOR COUGH SOLUTION ORAL
Qty: 118 MILLILITER | Refills: 0 | COMMUNITY

## 2024-10-21 RX ORDER — SPIRONOLACTONE 50 MG/1
TABLET ORAL
Qty: 30 TABLET | Status: ACTIVE | COMMUNITY

## 2024-10-21 RX ORDER — ALBUTEROL SULFATE 90 UG/1
AEROSOL, METERED RESPIRATORY (INHALATION)
Qty: 18 UNSPECIFIED | COMMUNITY

## 2024-10-21 RX ORDER — LOSARTAN POTASSIUM 100 MG/1
TAKE 1 TABLET BY MOUTH IN THE MORNING TABLET, FILM COATED ORAL
Qty: 30 EACH | Refills: 5 | COMMUNITY

## 2024-10-21 RX ORDER — METHYLPREDNISOLONE 4 MG/1
TAKE BY MOUTH AS DIRECTED ON INSIDE OF PACKAGE TABLET ORAL
Qty: 21 EACH | Refills: 0 | COMMUNITY

## 2024-10-21 RX ORDER — FUROSEMIDE 20 MG/1
TABLET ORAL
Qty: 30 TABLET | OUTPATIENT

## 2024-10-21 RX ORDER — NICOTINE 14 MG/24H
PATCH, EXTENDED RELEASE TRANSDERMAL
Qty: 28 PATCH | COMMUNITY

## 2024-10-21 RX ORDER — ONDANSETRON 4 MG/1
DISSOLVE 1 TABLET IN MOUTH EVERY 8 HOURS AS NEEDED FOR NAUSEA AND VOMITING TABLET, ORALLY DISINTEGRATING ORAL
Qty: 12 EACH | Refills: 0 | COMMUNITY

## 2024-10-21 RX ORDER — PANTOPRAZOLE 40 MG/1
TAKE 1 TABLET BY MOUTH TWICE DAILY TABLET, DELAYED RELEASE ORAL
Qty: 30 EACH | Refills: 0 | Status: ACTIVE | COMMUNITY

## 2024-10-21 RX ORDER — SPIRONOLACTONE 50 MG/1
1 TABLET TABLET, FILM COATED ORAL ONCE A DAY
Qty: 90 TABLET | Refills: 1 | Status: ACTIVE | COMMUNITY
Start: 2024-08-09 | End: 2025-02-04

## 2024-10-21 RX ORDER — ALBUTEROL SULFATE 90 UG/1
INHALE 1 TO 2 PUFFS BY MOUTH EVERY 4 HOURS AS NEEDED FOR WHEEZING AEROSOL, METERED RESPIRATORY (INHALATION)
Qty: 18 GRAM | Refills: 0 | COMMUNITY

## 2024-10-21 RX ORDER — NICOTINE 14 MG/24H
APPLY 1 PATCH TOPICALLY ONCE DAILY PATCH, EXTENDED RELEASE TRANSDERMAL
Qty: 28 EACH | Refills: 0 | COMMUNITY

## 2024-10-21 RX ORDER — RIFAXIMIN 550 MG/1
1 TABLET TABLET ORAL TWICE A DAY
Qty: 180 TABLET | Refills: 3 | Status: ACTIVE | COMMUNITY
Start: 2024-08-09

## 2024-10-21 RX ORDER — AZITHROMYCIN 250 MG/1
TAKE 2 TABLETS BY MOUTH ON DAY 1, AND THEN TAKE 1 TABLET BY MOUTH ONCE A DAY ON DAY 2 THROUGH DAY 5 TABLET, FILM COATED ORAL
Qty: 6 EACH | Refills: 0 | COMMUNITY

## 2024-10-21 RX ORDER — NADOLOL 20 MG/1
TAKE 1 TABLET BY MOUTH ONCE DAILY TABLET ORAL
Qty: 30 EACH | Refills: 0 | Status: ACTIVE | COMMUNITY

## 2024-10-21 RX ORDER — PANTOPRAZOLE 40 MG/1
TAKE 1 TABLET BY MOUTH ONCE DAILY TABLET, DELAYED RELEASE ORAL
Qty: 30 EACH | Refills: 0 | COMMUNITY

## 2024-10-21 RX ORDER — HYDROXYZINE HYDROCHLORIDE 25 MG/1
1 TABLET AS NEEDED TABLET, FILM COATED ORAL ONCE A DAY
Status: ACTIVE | COMMUNITY
Start: 2024-07-29

## 2024-10-21 RX ORDER — FUROSEMIDE 20 MG/1
TAKE 1 TABLET BY MOUTH ONCE DAILY FOR 30 DAYS TABLET ORAL
Qty: 30 TABLET | Refills: 11 | OUTPATIENT

## 2024-10-21 RX ORDER — LACTULOSE 10 G/15ML
15 ML SOLUTION ORAL
Qty: 1350 ML | Refills: 5 | Status: ACTIVE | COMMUNITY
Start: 2024-08-09 | End: 2025-02-04

## 2024-10-21 NOTE — HPI-TODAY'S VISIT:
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 For HEMOSTASIS, hemostatic spray deployed. No specimens collected. - EGD 6/14/24- examined duodenum normal with clotted blood and bile, large amount of clots and blood in gastric body, trace varices lower esophagus, not bleeding, esophagitis with no bleeding, small hiatal hernia. No gastric varices. Few superfiicial non bleeding antral ulcerations. No active bleeding noted during exam. No specimens collected. - No ETOH since hospital discharge - Takes vitamin d, spironolactone 50 qd, furosemide 20 QD, lactulose 30 ml bid, [id number] qd, pantoprazole 40 bid - Labs 7/5/2024- hgb 9.2, mcv 97, plt 78, na 141, k 3.8, cr 0.7, albumin 2.4, tbil 3.5, alp 111, ast 65, alt 29, mag 1.8,  8/9/2024- Laura Jewell NP - 60 yo female here for 1 month follow-up alcohol induced cirrhosis - Still getting very tired - Taking pantoprazole 40 bid, nadolol 20 qd, furosemide 20 mg qd, hydroxyzine 25 qd, spironolactone 50 qd, lactulose bid - Needs to start xifaxan having insurance issues - Has changed diet and makes her feel better - Only has BM every few days- needs to increase lactulose - No ETOH 6/14/2024 - Has not started AA at this time  10/21/24- Laura Jewell NP - 60 yo female here for 2 month follow-up alcohol induced cirrhosis - Taking- furosemide 20 mg QD, hydroxizine 25 qd,, spironolactone 50 mg qd, lactulose BID-TID, nadolol, 20, xifaxan bid - Still feeling very fatigued and tring to return to normal - No alcohol since 6/14/24- no formal program - Still having itching

## 2024-11-15 ENCOUNTER — LAB OUTSIDE AN ENCOUNTER (OUTPATIENT)
Dept: URBAN - METROPOLITAN AREA CLINIC 98 | Facility: CLINIC | Age: 59
End: 2024-11-15

## 2024-11-19 LAB
ALBUMIN: 3.1
ALKALINE PHOSPHATASE: 215
ALT (SGPT): 26
AST (SGOT): 50
BASO (ABSOLUTE): (no result)
BASOS: (no result)
BILIRUBIN, DIRECT: 1.73
BILIRUBIN, TOTAL: 4.3
BUN/CREATININE RATIO: 13
BUN: 9
CALCIUM: 8.7
CARBON DIOXIDE, TOTAL: 21
CHLORIDE: 107
CREATININE: 0.7
EGFR: 100
EOS (ABSOLUTE): (no result)
EOS: (no result)
FERRITIN, SERUM: 801
FOLATE (FOLIC ACID), SERUM: 6.8
GGT: 24
GLOBULIN, TOTAL: 4.2
GLUCOSE: 94
HEMATOCRIT: (no result)
HEMATOLOGY COMMENTS:: (no result)
HEMOGLOBIN: (no result)
IMMATURE CELLS: (no result)
IMMATURE GRANS (ABS): (no result)
IMMATURE GRANULOCYTES: (no result)
INR: 1.3
IRON BIND.CAP.(TIBC): 233
IRON SATURATION: 90
IRON: 210
LYMPHS (ABSOLUTE): (no result)
LYMPHS: (no result)
MAGNESIUM: 1.8
MCH: (no result)
MCHC: (no result)
MCV: (no result)
MONOCYTES(ABSOLUTE): (no result)
MONOCYTES: (no result)
NEUTROPHILS (ABSOLUTE): (no result)
NEUTROPHILS: (no result)
NRBC: (no result)
PLATELETS: (no result)
POTASSIUM: 3.8
PROTEIN, TOTAL: 7.3
PROTHROMBIN TIME: 14.6
RBC: (no result)
RDW: (no result)
REQUEST PROBLEM: (no result)
SODIUM: 138
UIBC: 23
VITAMIN B12: 618
VITAMIN D, 25-HYDROXY: 12.8
WBC: (no result)

## 2024-11-22 ENCOUNTER — OFFICE VISIT (OUTPATIENT)
Dept: URBAN - METROPOLITAN AREA CLINIC 98 | Facility: CLINIC | Age: 59
End: 2024-11-22
Payer: COMMERCIAL

## 2024-11-22 ENCOUNTER — LAB OUTSIDE AN ENCOUNTER (OUTPATIENT)
Dept: URBAN - METROPOLITAN AREA CLINIC 98 | Facility: CLINIC | Age: 59
End: 2024-11-22

## 2024-11-22 VITALS
TEMPERATURE: 96.1 F | WEIGHT: 160 LBS | HEIGHT: 67 IN | BODY MASS INDEX: 25.11 KG/M2 | HEART RATE: 60 BPM | SYSTOLIC BLOOD PRESSURE: 108 MMHG | DIASTOLIC BLOOD PRESSURE: 76 MMHG

## 2024-11-22 DIAGNOSIS — I85.10 SECONDARY ESOPHAGEAL VARICES WITHOUT BLEEDING: ICD-10-CM

## 2024-11-22 DIAGNOSIS — K70.31 ALCOHOLIC CIRRHOSIS OF LIVER WITH ASCITES: ICD-10-CM

## 2024-11-22 DIAGNOSIS — K76.6 PORTAL HYPERTENSION: ICD-10-CM

## 2024-11-22 DIAGNOSIS — K76.82 HEPATIC ENCEPHALOPATHY: ICD-10-CM

## 2024-11-22 DIAGNOSIS — Z90.49 HISTORY OF RESECTION OF LIVER: ICD-10-CM

## 2024-11-22 DIAGNOSIS — K76.0 HEPATIC STEATOSIS: ICD-10-CM

## 2024-11-22 DIAGNOSIS — R79.89 ELEVATED FERRITIN: ICD-10-CM

## 2024-11-22 DIAGNOSIS — D69.6 THROMBOCYTOPENIA: ICD-10-CM

## 2024-11-22 PROBLEM — 429281008: Status: ACTIVE | Noted: 2024-11-22

## 2024-11-22 PROCEDURE — 99214 OFFICE O/P EST MOD 30 MIN: CPT

## 2024-11-22 RX ORDER — ALBUTEROL SULFATE 90 UG/1
INHALE 1 TO 2 PUFFS BY MOUTH EVERY 4 HOURS AS NEEDED FOR WHEEZING AEROSOL, METERED RESPIRATORY (INHALATION)
Qty: 18 GRAM | Refills: 0 | COMMUNITY

## 2024-11-22 RX ORDER — BENZONATATE 100 MG/1
TAKE 1 CAPSULE BY MOUTH THREE TIMES DAILY AS NEEDED FOR COUGH CAPSULE ORAL
Qty: 21 EACH | Refills: 0 | COMMUNITY

## 2024-11-22 RX ORDER — LOSARTAN POTASSIUM 100 MG/1
TAKE 1 TABLET BY MOUTH IN THE MORNING TABLET, FILM COATED ORAL
Qty: 30 EACH | Refills: 5 | COMMUNITY

## 2024-11-22 RX ORDER — ALBUTEROL SULFATE 90 UG/1
AEROSOL, METERED RESPIRATORY (INHALATION)
Qty: 18 UNSPECIFIED | COMMUNITY

## 2024-11-22 RX ORDER — PANTOPRAZOLE SODIUM 40 MG/1
1 TABLET TABLET, DELAYED RELEASE ORAL TWICE A DAY
Qty: 60 TABLET | Refills: 0 | Status: ON HOLD | COMMUNITY
Start: 2024-08-09

## 2024-11-22 RX ORDER — NADOLOL 20 MG/1
1 TABLET TABLET ORAL ONCE A DAY
Qty: 90 TABLET | Refills: 1 | Status: ACTIVE | COMMUNITY
Start: 2024-08-09

## 2024-11-22 RX ORDER — FUROSEMIDE 20 MG/1
TAKE 1 TABLET BY MOUTH ONCE DAILY FOR 30 DAYS TABLET ORAL
Qty: 30 TABLET | Refills: 11 | Status: ACTIVE | COMMUNITY

## 2024-11-22 RX ORDER — LACTULOSE 10 G/15ML
15 ML SOLUTION ORAL
Qty: 1350 ML | Refills: 5 | Status: ACTIVE | COMMUNITY
Start: 2024-08-09 | End: 2025-02-04

## 2024-11-22 RX ORDER — PROMETHAZINE HYDROCHLORIDE AND DEXTROMETHORPHAN HYDROBROMIDE 6.25; 15 MG/5ML; MG/5ML
TAKE 5 ML BY MOUTH 4 TIMES DAILY AS NEEDED FOR COUGH SOLUTION ORAL
Qty: 118 MILLILITER | Refills: 0 | COMMUNITY

## 2024-11-22 RX ORDER — SPIRONOLACTONE 50 MG/1
1 TABLET TABLET, FILM COATED ORAL ONCE A DAY
Qty: 90 TABLET | Refills: 1 | Status: ACTIVE | COMMUNITY
Start: 2024-08-09 | End: 2025-02-04

## 2024-11-22 RX ORDER — HYDROXYZINE HYDROCHLORIDE 25 MG/1
1 TABLET AS NEEDED TABLET, FILM COATED ORAL ONCE A DAY
Status: ON HOLD | COMMUNITY
Start: 2024-07-29

## 2024-11-22 RX ORDER — ONDANSETRON 4 MG/1
DISSOLVE 1 TABLET IN MOUTH EVERY 8 HOURS AS NEEDED FOR NAUSEA AND VOMITING TABLET, ORALLY DISINTEGRATING ORAL
Qty: 12 EACH | Refills: 0 | COMMUNITY

## 2024-11-22 RX ORDER — RIFAXIMIN 550 MG/1
1 TABLET TABLET ORAL TWICE A DAY
Qty: 180 TABLET | Refills: 3 | Status: ACTIVE | COMMUNITY
Start: 2024-08-09

## 2024-11-22 RX ORDER — METHYLPREDNISOLONE 4 MG/1
TAKE BY MOUTH AS DIRECTED ON INSIDE OF PACKAGE TABLET ORAL
Qty: 21 EACH | Refills: 0 | COMMUNITY

## 2024-11-22 RX ORDER — NICOTINE 14 MG/24H
APPLY 1 PATCH TOPICALLY ONCE DAILY PATCH, EXTENDED RELEASE TRANSDERMAL
Qty: 28 EACH | Refills: 0 | COMMUNITY

## 2024-11-22 RX ORDER — LORAZEPAM 0.5 MG/1
TAKE 1 TABLET BY MOUTH TWICE DAILY AS NEEDED FOR ANXIETY TABLET ORAL
Qty: 14 EACH | Refills: 0 | COMMUNITY

## 2024-11-22 RX ORDER — PROMETHAZINE HYDROCHLORIDE 25 MG/1
TAKE 1 TABLET BY MOUTH EVERY 4 HOURS AS NEEDED FOR NAUSEA AND VOMITING TABLET ORAL
Qty: 12 EACH | Refills: 0 | COMMUNITY

## 2024-11-22 RX ORDER — PANTOPRAZOLE 40 MG/1
TAKE 1 TABLET BY MOUTH ONCE DAILY TABLET, DELAYED RELEASE ORAL
Qty: 30 EACH | Refills: 0 | COMMUNITY

## 2024-11-22 RX ORDER — AZITHROMYCIN 250 MG/1
TAKE 2 TABLETS BY MOUTH ON DAY 1, AND THEN TAKE 1 TABLET BY MOUTH ONCE A DAY ON DAY 2 THROUGH DAY 5 TABLET, FILM COATED ORAL
Qty: 6 EACH | Refills: 0 | COMMUNITY

## 2024-11-22 RX ORDER — PREDNISONE 20 MG/1
TAKE 3 TABLETS BY MOUTH ONCE DAILY TABLET ORAL
Qty: 15 EACH | Refills: 0 | COMMUNITY

## 2024-11-22 NOTE — HPI-TODAY'S VISIT:
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 For HEMOSTASIS, hemostatic spray deployed. No specimens collected. - EGD 6/14/24- examined duodenum normal with clotted blood and bile, large amount of clots and blood in gastric body, trace varices lower esophagus, not bleeding, esophagitis with no bleeding, small hiatal hernia. No gastric varices. Few superfiicial non bleeding antral ulcerations. No active bleeding noted during exam. No specimens collected. - No ETOH since hospital discharge - Takes vitamin d, spironolactone 50 qd, furosemide 20 QD, lactulose 30 ml bid, [id number] qd, pantoprazole 40 bid - Labs 7/5/2024- hgb 9.2, mcv 97, plt 78, na 141, k 3.8, cr 0.7, albumin 2.4, tbil 3.5, alp 111, ast 65, alt 29, mag 1.8,  8/9/2024- Laura Jewell NP - 60 yo female here for 1 month follow-up alcohol induced cirrhosis - Still getting very tired - Taking pantoprazole 40 bid, nadolol 20 qd, furosemide 20 mg qd, hydroxyzine 25 qd, spironolactone 50 qd, lactulose bid - Needs to start xifaxan having insurance issues - Has changed diet and makes her feel better - Only has BM every few days- needs to increase lactulose - No ETOH 6/14/2024 - Has not started AA at this time  10/21/24- Laura Jewell NP - 60 yo female here for 2 month follow-up alcohol induced cirrhosis - Taking- furosemide 20 mg QD, hydroxizine 25 qd,, spironolactone 50 mg qd, lactulose BID-TID, nadolol, 20, xifaxan bid - Still feeling very fatigued and tring to return to normal - No alcohol since 6/14/24- no formal program - Still having itching  11/22/24- Laura Jewell, NP -60 yo female here for 1 month follow-up alcohol induced cirrhosis - Last etoh 6/2024 - Feeling much better with less fatigue - Taking:xifaxn bid,nadolol 20, furosemide 20 qd, spironolactone 50 qd, lactulose bid, vitamin d 3 - No longer having itching

## 2024-12-03 ENCOUNTER — WEB ENCOUNTER (OUTPATIENT)
Dept: URBAN - METROPOLITAN AREA CLINIC 98 | Facility: CLINIC | Age: 59
End: 2024-12-03

## 2024-12-05 ENCOUNTER — LAB OUTSIDE AN ENCOUNTER (OUTPATIENT)
Dept: URBAN - METROPOLITAN AREA CLINIC 98 | Facility: CLINIC | Age: 59
End: 2024-12-05

## 2024-12-11 ENCOUNTER — TELEPHONE ENCOUNTER (OUTPATIENT)
Dept: URBAN - METROPOLITAN AREA CLINIC 98 | Facility: CLINIC | Age: 59
End: 2024-12-11

## 2024-12-12 ENCOUNTER — LAB OUTSIDE AN ENCOUNTER (OUTPATIENT)
Dept: URBAN - METROPOLITAN AREA CLINIC 98 | Facility: CLINIC | Age: 59
End: 2024-12-12

## 2024-12-12 LAB
ALBUMIN: 3.1
ALKALINE PHOSPHATASE: 180
ALT (SGPT): 28
AST (SGOT): 55
BASO (ABSOLUTE): (no result)
BASOS: (no result)
BILIRUBIN, TOTAL: 4.3
BUN/CREATININE RATIO: 15
BUN: 12
CALCIUM: 9
CARBON DIOXIDE, TOTAL: 20
CHLORIDE: 103
CREATININE: 0.82
EGFR: 82
EOS (ABSOLUTE): (no result)
EOS: (no result)
FERRITIN, SERUM: 677
GGT: 19
GLOBULIN, TOTAL: 3.7
GLUCOSE: 121
HEMATOCRIT: (no result)
HEMATOLOGY COMMENTS:: (no result)
HEMOGLOBIN: (no result)
HEREDITARY  HEMOCHROMATOSIS: (no result)
IMMATURE CELLS: (no result)
IMMATURE GRANS (ABS): (no result)
IMMATURE GRANULOCYTES: (no result)
INR: 1.3
IRON BIND.CAP.(TIBC): <220
IRON SATURATION: >92
IRON: 203
LYMPHS (ABSOLUTE): (no result)
LYMPHS: (no result)
Lab: (no result)
Lab: 1.35
Lab: 1.5
Lab: 2.8
MCH: (no result)
MCHC: (no result)
MCV: (no result)
MONOCYTES(ABSOLUTE): (no result)
MONOCYTES: (no result)
NEUTROPHILS (ABSOLUTE): (no result)
NEUTROPHILS: (no result)
NRBC: (no result)
PLATELETS: (no result)
POTASSIUM: 3.5
PROTEIN, TOTAL: 6.8
PROTHROMBIN TIME: 14.6
RBC: (no result)
RDW: (no result)
REQUEST PROBLEM: (no result)
SODIUM: 136
UIBC: <17
WBC: (no result)

## 2024-12-20 ENCOUNTER — OFFICE VISIT (OUTPATIENT)
Dept: URBAN - METROPOLITAN AREA CLINIC 98 | Facility: CLINIC | Age: 59
End: 2024-12-20

## 2024-12-20 ENCOUNTER — WEB ENCOUNTER (OUTPATIENT)
Dept: URBAN - METROPOLITAN AREA CLINIC 98 | Facility: CLINIC | Age: 59
End: 2024-12-20

## 2024-12-20 RX ORDER — NICOTINE 14 MG/24H
APPLY 1 PATCH TOPICALLY ONCE DAILY PATCH, EXTENDED RELEASE TRANSDERMAL
Qty: 28 EACH | Refills: 0 | COMMUNITY

## 2024-12-20 RX ORDER — LOSARTAN POTASSIUM 100 MG/1
TAKE 1 TABLET BY MOUTH IN THE MORNING TABLET, FILM COATED ORAL
Qty: 30 EACH | Refills: 5 | COMMUNITY

## 2024-12-20 RX ORDER — AZITHROMYCIN 250 MG/1
TAKE 2 TABLETS BY MOUTH ON DAY 1, AND THEN TAKE 1 TABLET BY MOUTH ONCE A DAY ON DAY 2 THROUGH DAY 5 TABLET, FILM COATED ORAL
Qty: 6 EACH | Refills: 0 | COMMUNITY

## 2024-12-20 RX ORDER — ONDANSETRON 4 MG/1
DISSOLVE 1 TABLET IN MOUTH EVERY 8 HOURS AS NEEDED FOR NAUSEA AND VOMITING TABLET, ORALLY DISINTEGRATING ORAL
Qty: 12 EACH | Refills: 0 | COMMUNITY

## 2024-12-20 RX ORDER — ALBUTEROL SULFATE 90 UG/1
INHALE 1 TO 2 PUFFS BY MOUTH EVERY 4 HOURS AS NEEDED FOR WHEEZING AEROSOL, METERED RESPIRATORY (INHALATION)
Qty: 18 GRAM | Refills: 0 | COMMUNITY

## 2024-12-20 RX ORDER — PREDNISONE 20 MG/1
TAKE 3 TABLETS BY MOUTH ONCE DAILY TABLET ORAL
Qty: 15 EACH | Refills: 0 | COMMUNITY

## 2024-12-20 RX ORDER — BENZONATATE 100 MG/1
TAKE 1 CAPSULE BY MOUTH THREE TIMES DAILY AS NEEDED FOR COUGH CAPSULE ORAL
Qty: 21 EACH | Refills: 0 | COMMUNITY

## 2024-12-20 RX ORDER — LORAZEPAM 0.5 MG/1
TAKE 1 TABLET BY MOUTH TWICE DAILY AS NEEDED FOR ANXIETY TABLET ORAL
Qty: 14 EACH | Refills: 0 | COMMUNITY

## 2024-12-20 RX ORDER — RIFAXIMIN 550 MG/1
1 TABLET TABLET ORAL TWICE A DAY
Qty: 180 TABLET | Refills: 3 | Status: ACTIVE | COMMUNITY
Start: 2024-08-09

## 2024-12-20 RX ORDER — NADOLOL 20 MG/1
1 TABLET TABLET ORAL ONCE A DAY
Qty: 90 TABLET | Refills: 1 | Status: ACTIVE | COMMUNITY
Start: 2024-08-09

## 2024-12-20 RX ORDER — HYDROXYZINE HYDROCHLORIDE 25 MG/1
1 TABLET AS NEEDED TABLET, FILM COATED ORAL ONCE A DAY
Status: ON HOLD | COMMUNITY
Start: 2024-07-29

## 2024-12-20 RX ORDER — PROMETHAZINE HYDROCHLORIDE 25 MG/1
TAKE 1 TABLET BY MOUTH EVERY 4 HOURS AS NEEDED FOR NAUSEA AND VOMITING TABLET ORAL
Qty: 12 EACH | Refills: 0 | COMMUNITY

## 2024-12-20 RX ORDER — PANTOPRAZOLE SODIUM 40 MG/1
1 TABLET TABLET, DELAYED RELEASE ORAL TWICE A DAY
Qty: 60 TABLET | Refills: 0 | Status: ON HOLD | COMMUNITY
Start: 2024-08-09

## 2024-12-20 RX ORDER — SPIRONOLACTONE 50 MG/1
1 TABLET TABLET, FILM COATED ORAL ONCE A DAY
Qty: 90 TABLET | Refills: 1 | Status: ACTIVE | COMMUNITY
Start: 2024-08-09 | End: 2025-02-04

## 2024-12-20 RX ORDER — PROMETHAZINE HYDROCHLORIDE AND DEXTROMETHORPHAN HYDROBROMIDE 6.25; 15 MG/5ML; MG/5ML
TAKE 5 ML BY MOUTH 4 TIMES DAILY AS NEEDED FOR COUGH SOLUTION ORAL
Qty: 118 MILLILITER | Refills: 0 | COMMUNITY

## 2024-12-20 RX ORDER — ALBUTEROL SULFATE 90 UG/1
AEROSOL, METERED RESPIRATORY (INHALATION)
Qty: 18 UNSPECIFIED | COMMUNITY

## 2024-12-20 RX ORDER — METHYLPREDNISOLONE 4 MG/1
TAKE BY MOUTH AS DIRECTED ON INSIDE OF PACKAGE TABLET ORAL
Qty: 21 EACH | Refills: 0 | COMMUNITY

## 2024-12-20 RX ORDER — FUROSEMIDE 20 MG/1
TAKE 1 TABLET BY MOUTH ONCE DAILY FOR 30 DAYS TABLET ORAL
Qty: 30 TABLET | Refills: 11 | Status: ACTIVE | COMMUNITY

## 2024-12-20 RX ORDER — LACTULOSE 10 G/15ML
15 ML SOLUTION ORAL
Qty: 1350 ML | Refills: 5 | Status: ACTIVE | COMMUNITY
Start: 2024-08-09 | End: 2025-02-04

## 2024-12-20 RX ORDER — PANTOPRAZOLE 40 MG/1
TAKE 1 TABLET BY MOUTH ONCE DAILY TABLET, DELAYED RELEASE ORAL
Qty: 30 EACH | Refills: 0 | COMMUNITY

## 2025-01-08 ENCOUNTER — TELEPHONE ENCOUNTER (OUTPATIENT)
Dept: URBAN - METROPOLITAN AREA CLINIC 3 | Facility: CLINIC | Age: 60
End: 2025-01-08

## 2025-01-09 ENCOUNTER — WEB ENCOUNTER (OUTPATIENT)
Dept: URBAN - METROPOLITAN AREA CLINIC 98 | Facility: CLINIC | Age: 60
End: 2025-01-09

## 2025-01-13 ENCOUNTER — WEB ENCOUNTER (OUTPATIENT)
Dept: URBAN - METROPOLITAN AREA CLINIC 98 | Facility: CLINIC | Age: 60
End: 2025-01-13

## 2025-01-13 RX ORDER — NADOLOL 20 MG/1
1 TABLET TABLET ORAL ONCE A DAY
Qty: 90 TABLET | Refills: 1
Start: 2024-08-09

## 2025-04-17 ENCOUNTER — CLAIMS CREATED FROM THE CLAIM WINDOW (OUTPATIENT)
Dept: URBAN - METROPOLITAN AREA MEDICAL CENTER 28 | Facility: MEDICAL CENTER | Age: 60
End: 2025-04-17

## 2025-04-17 PROCEDURE — 99255 IP/OBS CONSLTJ NEW/EST HI 80: CPT

## 2025-04-17 PROCEDURE — 43235 EGD DIAGNOSTIC BRUSH WASH: CPT | Performed by: INTERNAL MEDICINE

## 2025-04-18 ENCOUNTER — CLAIMS CREATED FROM THE CLAIM WINDOW (OUTPATIENT)
Dept: URBAN - METROPOLITAN AREA MEDICAL CENTER 28 | Facility: MEDICAL CENTER | Age: 60
End: 2025-04-18

## 2025-04-18 PROCEDURE — 99232 SBSQ HOSP IP/OBS MODERATE 35: CPT

## 2025-04-19 ENCOUNTER — CLAIMS CREATED FROM THE CLAIM WINDOW (OUTPATIENT)
Dept: URBAN - METROPOLITAN AREA MEDICAL CENTER 28 | Facility: MEDICAL CENTER | Age: 60
End: 2025-04-19

## 2025-04-19 PROCEDURE — 99232 SBSQ HOSP IP/OBS MODERATE 35: CPT | Performed by: INTERNAL MEDICINE

## 2025-04-21 ENCOUNTER — TELEPHONE ENCOUNTER (OUTPATIENT)
Dept: URBAN - METROPOLITAN AREA CLINIC 98 | Facility: CLINIC | Age: 60
End: 2025-04-21

## 2025-04-22 ENCOUNTER — OFFICE VISIT (OUTPATIENT)
Dept: URBAN - METROPOLITAN AREA CLINIC 98 | Facility: CLINIC | Age: 60
End: 2025-04-22

## 2025-04-22 PROBLEM — 51868009: Status: ACTIVE | Noted: 2025-04-22

## 2025-04-22 RX ORDER — NICOTINE 14 MG/24H
APPLY 1 PATCH TOPICALLY ONCE DAILY PATCH, EXTENDED RELEASE TRANSDERMAL
Qty: 28 EACH | Refills: 0 | COMMUNITY

## 2025-04-22 RX ORDER — LOSARTAN POTASSIUM 100 MG/1
TAKE 1 TABLET BY MOUTH IN THE MORNING TABLET, FILM COATED ORAL
Qty: 30 EACH | Refills: 5 | COMMUNITY

## 2025-04-22 RX ORDER — PREDNISONE 20 MG/1
TAKE 3 TABLETS BY MOUTH ONCE DAILY TABLET ORAL
Qty: 15 EACH | Refills: 0 | COMMUNITY

## 2025-04-22 RX ORDER — ALBUTEROL SULFATE 90 UG/1
AEROSOL, METERED RESPIRATORY (INHALATION)
Qty: 18 UNSPECIFIED | COMMUNITY

## 2025-04-22 RX ORDER — ONDANSETRON 4 MG/1
DISSOLVE 1 TABLET IN MOUTH EVERY 8 HOURS AS NEEDED FOR NAUSEA AND VOMITING TABLET, ORALLY DISINTEGRATING ORAL
Qty: 12 EACH | Refills: 0 | COMMUNITY

## 2025-04-22 RX ORDER — BENZONATATE 100 MG/1
TAKE 1 CAPSULE BY MOUTH THREE TIMES DAILY AS NEEDED FOR COUGH CAPSULE ORAL
Qty: 21 EACH | Refills: 0 | COMMUNITY

## 2025-04-22 RX ORDER — PROMETHAZINE HYDROCHLORIDE AND DEXTROMETHORPHAN HYDROBROMIDE 6.25; 15 MG/5ML; MG/5ML
TAKE 5 ML BY MOUTH 4 TIMES DAILY AS NEEDED FOR COUGH SOLUTION ORAL
Qty: 118 MILLILITER | Refills: 0 | COMMUNITY

## 2025-04-22 RX ORDER — PANTOPRAZOLE 40 MG/1
TAKE 1 TABLET BY MOUTH ONCE DAILY TABLET, DELAYED RELEASE ORAL
Qty: 30 EACH | Refills: 0 | COMMUNITY

## 2025-04-22 RX ORDER — HYDROXYZINE HYDROCHLORIDE 25 MG/1
1 TABLET AS NEEDED TABLET, FILM COATED ORAL ONCE A DAY
Status: ON HOLD | COMMUNITY
Start: 2024-07-29

## 2025-04-22 RX ORDER — PROMETHAZINE HYDROCHLORIDE 25 MG/1
TAKE 1 TABLET BY MOUTH EVERY 4 HOURS AS NEEDED FOR NAUSEA AND VOMITING TABLET ORAL
Qty: 12 EACH | Refills: 0 | COMMUNITY

## 2025-04-22 RX ORDER — LORAZEPAM 0.5 MG/1
TAKE 1 TABLET BY MOUTH TWICE DAILY AS NEEDED FOR ANXIETY TABLET ORAL
Qty: 14 EACH | Refills: 0 | COMMUNITY

## 2025-04-22 RX ORDER — NADOLOL 20 MG/1
1 TABLET TABLET ORAL ONCE A DAY
Qty: 90 TABLET | Refills: 1 | Status: ACTIVE | COMMUNITY
Start: 2024-08-09

## 2025-04-22 RX ORDER — AZITHROMYCIN 250 MG/1
TAKE 2 TABLETS BY MOUTH ON DAY 1, AND THEN TAKE 1 TABLET BY MOUTH ONCE A DAY ON DAY 2 THROUGH DAY 5 TABLET, FILM COATED ORAL
Qty: 6 EACH | Refills: 0 | COMMUNITY

## 2025-04-22 RX ORDER — RIFAXIMIN 550 MG/1
1 TABLET TABLET ORAL TWICE A DAY
Qty: 180 TABLET | Refills: 3 | Status: ACTIVE | COMMUNITY
Start: 2024-08-09

## 2025-04-22 RX ORDER — ALBUTEROL SULFATE 90 UG/1
INHALE 1 TO 2 PUFFS BY MOUTH EVERY 4 HOURS AS NEEDED FOR WHEEZING AEROSOL, METERED RESPIRATORY (INHALATION)
Qty: 18 GRAM | Refills: 0 | COMMUNITY

## 2025-04-22 RX ORDER — METHYLPREDNISOLONE 4 MG/1
TAKE BY MOUTH AS DIRECTED ON INSIDE OF PACKAGE TABLET ORAL
Qty: 21 EACH | Refills: 0 | COMMUNITY

## 2025-04-22 RX ORDER — PANTOPRAZOLE SODIUM 40 MG/1
1 TABLET TABLET, DELAYED RELEASE ORAL TWICE A DAY
Qty: 60 TABLET | Refills: 0 | Status: ON HOLD | COMMUNITY
Start: 2024-08-09

## 2025-04-22 RX ORDER — FUROSEMIDE 20 MG/1
TAKE 1 TABLET BY MOUTH ONCE DAILY FOR 30 DAYS TABLET ORAL
Qty: 30 TABLET | Refills: 11 | Status: ACTIVE | COMMUNITY

## 2025-04-22 NOTE — HPI-TODAY'S VISIT:
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 For HEMOSTASIS, hemostatic spray deployed. No specimens collected. - EGD 6/14/24- examined duodenum normal with clotted blood and bile, large amount of clots and blood in gastric body, trace varices lower esophagus, not bleeding, esophagitis with no bleeding, small hiatal hernia. No gastric varices. Few superfiicial non bleeding antral ulcerations. No active bleeding noted during exam. No specimens collected. - No ETOH since hospital discharge - Takes vitamin d, spironolactone 50 qd, furosemide 20 QD, lactulose 30 ml bid, [id number] qd, pantoprazole 40 bid - Labs 7/5/2024- hgb 9.2, mcv 97, plt 78, na 141, k 3.8, cr 0.7, albumin 2.4, tbil 3.5, alp 111, ast 65, alt 29, mag 1.8,  8/9/2024- Laura Jewell NP - 58 yo female here for 1 month follow-up alcohol induced cirrhosis - Still getting very tired - Taking pantoprazole 40 bid, nadolol 20 qd, furosemide 20 mg qd, hydroxyzine 25 qd, spironolactone 50 qd, lactulose bid - Needs to start xifaxan having insurance issues - Has changed diet and makes her feel better - Only has BM every few days- needs to increase lactulose - No ETOH 6/14/2024 - Has not started AA at this time  10/21/24- Laura Jewell NP - 58 yo female here for 2 month follow-up alcohol induced cirrhosis - Taking- furosemide 20 mg QD, hydroxizine 25 qd,, spironolactone 50 mg qd, lactulose BID-TID, nadolol, 20, xifaxan bid - Still feeling very fatigued and tring to return to normal - No alcohol since 6/14/24- no formal program - Still having itching  11/22/24- Laura Jewell, SERA -58 yo female here for 1 month follow-up alcohol induced cirrhosis - Last etoh 6/2024 - Feeling much better with less fatigue - Taking:xifaxn bid,nadolol 20, furosemide 20 qd, spironolactone 50 qd, lactulose bid, vitamin d 3 - No longer having itching  4/22/25- Laura Jewell, SERA - 58 yo female here to follow-up alcohol induced cirrhosis - Last visit 11/22/24 - Recently hospitalized [facility] 4/16-4/19/25- Started vomiting blood, N/V, black stools and constipation. EGD ([facility]) 4/17/25- No esophageal varices. One non-bleeding cratered duodenal ulcer with no bleeding. mild gastritis Labs 4/16/25- wbc 12.7, hgb 7.4, hct 22, plt 101 na 137, cr 1.4, tbil 5, alp 117, ast 57, alt 32 ammonia 48 Received 2 units PRBC CT scan abdomen with contrast- colitis ascending and transverse colon. splenomegaly left kidney cyst. suggest mri 3 months - Ordered antibiotics - needs to start - Taking: lactulose BID, xifaxan bid, vitamin d, and nadolol 20 mg  Now - BM 2 times per day- small amount - Stools firm - denies melena, no dark stools - No n/v, heartburn, dysphagia,  - No abdominal - last etoh 6/2024

## 2025-04-23 LAB
ALBUMIN: 2.9
ALKALINE PHOSPHATASE: 207
ALT (SGPT): 37
AST (SGOT): 55
BASO (ABSOLUTE): 0
BASOS: 1
BILIRUBIN, TOTAL: 5.1
BUN/CREATININE RATIO: 12
BUN: 9
CALCIUM: 8.4
CARBON DIOXIDE, TOTAL: 20
CHLORIDE: 104
CREATININE: 0.76
EGFR: 90
EOS (ABSOLUTE): 0.2
EOS: 3
FERRITIN, SERUM: 658
GLOBULIN, TOTAL: 3.1
GLUCOSE: 95
HEMATOCRIT: 25.9
HEMATOLOGY COMMENTS:: (no result)
HEMOGLOBIN: 9.2
IMMATURE CELLS: (no result)
IMMATURE GRANS (ABS): 0
IMMATURE GRANULOCYTES: 0
INR: 1.4
IRON BIND.CAP.(TIBC): 196
IRON SATURATION: 44
IRON: 87
LYMPHS (ABSOLUTE): 1.2
LYMPHS: 22
MCH: 34.1
MCHC: 35.5
MCV: 96
MONOCYTES(ABSOLUTE): 0.5
MONOCYTES: 8
NEUTROPHILS (ABSOLUTE): 3.8
NEUTROPHILS: 66
NRBC: (no result)
PLATELETS: 84
POTASSIUM: 3.3
PROTEIN, TOTAL: 6
PROTHROMBIN TIME: 15.6
RBC: 2.7
RDW: 15.7
SODIUM: 136
UIBC: 109
WBC: 5.7

## 2025-04-29 ENCOUNTER — WEB ENCOUNTER (OUTPATIENT)
Dept: URBAN - METROPOLITAN AREA CLINIC 98 | Facility: CLINIC | Age: 60
End: 2025-04-29

## 2025-04-30 ENCOUNTER — TELEPHONE ENCOUNTER (OUTPATIENT)
Dept: URBAN - METROPOLITAN AREA CLINIC 3 | Facility: CLINIC | Age: 60
End: 2025-04-30

## 2025-05-01 ENCOUNTER — TELEPHONE ENCOUNTER (OUTPATIENT)
Dept: URBAN - METROPOLITAN AREA CLINIC 98 | Facility: CLINIC | Age: 60
End: 2025-05-01

## 2025-05-06 ENCOUNTER — WEB ENCOUNTER (OUTPATIENT)
Dept: URBAN - METROPOLITAN AREA CLINIC 98 | Facility: CLINIC | Age: 60
End: 2025-05-06

## 2025-05-06 ENCOUNTER — TELEPHONE ENCOUNTER (OUTPATIENT)
Dept: URBAN - METROPOLITAN AREA CLINIC 98 | Facility: CLINIC | Age: 60
End: 2025-05-06

## 2025-05-13 ENCOUNTER — OFFICE VISIT (OUTPATIENT)
Dept: URBAN - METROPOLITAN AREA CLINIC 98 | Facility: CLINIC | Age: 60
End: 2025-05-13

## 2025-05-15 ENCOUNTER — OFFICE VISIT (OUTPATIENT)
Dept: URBAN - METROPOLITAN AREA CLINIC 98 | Facility: CLINIC | Age: 60
End: 2025-05-15

## 2025-05-15 ENCOUNTER — LAB OUTSIDE AN ENCOUNTER (OUTPATIENT)
Dept: URBAN - METROPOLITAN AREA CLINIC 98 | Facility: CLINIC | Age: 60
End: 2025-05-15

## 2025-05-15 RX ORDER — LORAZEPAM 0.5 MG/1
TAKE 1 TABLET BY MOUTH TWICE DAILY AS NEEDED FOR ANXIETY TABLET ORAL
Qty: 14 EACH | Refills: 0 | Status: ON HOLD | COMMUNITY

## 2025-05-15 RX ORDER — HYDROXYZINE HYDROCHLORIDE 25 MG/1
1 TABLET AS NEEDED TABLET, FILM COATED ORAL ONCE A DAY
Status: ON HOLD | COMMUNITY
Start: 2024-07-29

## 2025-05-15 RX ORDER — PANTOPRAZOLE 40 MG/1
TAKE 1 TABLET BY MOUTH ONCE DAILY TABLET, DELAYED RELEASE ORAL
Qty: 30 EACH | Refills: 0 | Status: ACTIVE | COMMUNITY

## 2025-05-15 RX ORDER — AZITHROMYCIN 250 MG/1
TAKE 2 TABLETS BY MOUTH ON DAY 1, AND THEN TAKE 1 TABLET BY MOUTH ONCE A DAY ON DAY 2 THROUGH DAY 5 TABLET, FILM COATED ORAL
Qty: 6 EACH | Refills: 0 | Status: ON HOLD | COMMUNITY

## 2025-05-15 RX ORDER — METHYLPREDNISOLONE 4 MG/1
TAKE BY MOUTH AS DIRECTED ON INSIDE OF PACKAGE TABLET ORAL
Qty: 21 EACH | Refills: 0 | Status: ON HOLD | COMMUNITY

## 2025-05-15 RX ORDER — NICOTINE 14 MG/24H
APPLY 1 PATCH TOPICALLY ONCE DAILY PATCH, EXTENDED RELEASE TRANSDERMAL
Qty: 28 EACH | Refills: 0 | Status: ON HOLD | COMMUNITY

## 2025-05-15 RX ORDER — RIFAXIMIN 550 MG/1
1 TABLET TABLET ORAL TWICE A DAY
Qty: 180 TABLET | Refills: 3 | Status: ACTIVE | COMMUNITY
Start: 2024-08-09

## 2025-05-15 RX ORDER — ALBUTEROL SULFATE 90 UG/1
AEROSOL, METERED RESPIRATORY (INHALATION)
Qty: 18 UNSPECIFIED | Status: ON HOLD | COMMUNITY

## 2025-05-15 RX ORDER — BENZONATATE 100 MG/1
TAKE 1 CAPSULE BY MOUTH THREE TIMES DAILY AS NEEDED FOR COUGH CAPSULE ORAL
Qty: 21 EACH | Refills: 0 | Status: ON HOLD | COMMUNITY

## 2025-05-15 RX ORDER — NADOLOL 20 MG/1
1 TABLET TABLET ORAL ONCE A DAY
Qty: 90 TABLET | Refills: 1 | Status: ACTIVE | COMMUNITY
Start: 2024-08-09

## 2025-05-15 RX ORDER — FUROSEMIDE 20 MG/1
TAKE 1 TABLET BY MOUTH ONCE DAILY FOR 30 DAYS TABLET ORAL
Qty: 30 TABLET | Refills: 11 | Status: ON HOLD | COMMUNITY

## 2025-05-15 RX ORDER — PROMETHAZINE HYDROCHLORIDE 25 MG/1
TAKE 1 TABLET BY MOUTH EVERY 4 HOURS AS NEEDED FOR NAUSEA AND VOMITING TABLET ORAL
Qty: 12 EACH | Refills: 0 | Status: ON HOLD | COMMUNITY

## 2025-05-15 RX ORDER — ONDANSETRON 4 MG/1
DISSOLVE 1 TABLET IN MOUTH EVERY 8 HOURS AS NEEDED FOR NAUSEA AND VOMITING TABLET, ORALLY DISINTEGRATING ORAL
Qty: 12 EACH | Refills: 0 | Status: ON HOLD | COMMUNITY

## 2025-05-15 RX ORDER — LACTULOSE 10 G/15ML
15 ML AS NEEDED SOLUTION ORAL
Qty: 1350 ML | Refills: 5 | OUTPATIENT
Start: 2025-05-15

## 2025-05-15 RX ORDER — PANTOPRAZOLE SODIUM 40 MG/1
1 TABLET TABLET, DELAYED RELEASE ORAL TWICE A DAY
Qty: 60 TABLET | Refills: 0 | Status: ON HOLD | COMMUNITY
Start: 2024-08-09

## 2025-05-15 RX ORDER — PROMETHAZINE HYDROCHLORIDE AND DEXTROMETHORPHAN HYDROBROMIDE 6.25; 15 MG/5ML; MG/5ML
TAKE 5 ML BY MOUTH 4 TIMES DAILY AS NEEDED FOR COUGH SOLUTION ORAL
Qty: 118 MILLILITER | Refills: 0 | Status: ON HOLD | COMMUNITY

## 2025-05-15 RX ORDER — ALBUTEROL SULFATE 90 UG/1
INHALE 1 TO 2 PUFFS BY MOUTH EVERY 4 HOURS AS NEEDED FOR WHEEZING AEROSOL, METERED RESPIRATORY (INHALATION)
Qty: 18 GRAM | Refills: 0 | Status: ON HOLD | COMMUNITY

## 2025-05-15 RX ORDER — PREDNISONE 20 MG/1
TAKE 3 TABLETS BY MOUTH ONCE DAILY TABLET ORAL
Qty: 15 EACH | Refills: 0 | Status: ON HOLD | COMMUNITY

## 2025-05-15 RX ORDER — LOSARTAN POTASSIUM 100 MG/1
TAKE 1 TABLET BY MOUTH IN THE MORNING TABLET, FILM COATED ORAL
Qty: 30 EACH | Refills: 5 | Status: ON HOLD | COMMUNITY

## 2025-05-15 NOTE — HPI-OTHER HISTORIES
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 For HEMOSTASIS, hemostatic spray deployed. No specimens collected. - EGD 6/14/24- examined duodenum normal with clotted blood and bile, large amount of clots and blood in gastric body, trace varices lower esophagus, not bleeding, esophagitis with no bleeding, small hiatal hernia. No gastric varices. Few superfiicial non bleeding antral ulcerations. No active bleeding noted during exam. No specimens collected. - No ETOH since hospital discharge - Takes vitamin d, spironolactone 50 qd, furosemide 20 QD, lactulose 30 ml bid, [id number] qd, pantoprazole 40 bid - Labs 7/5/2024- hgb 9.2, mcv 97, plt 78, na 141, k 3.8, cr 0.7, albumin 2.4, tbil 3.5, alp 111, ast 65, alt 29, mag 1.8,  8/9/2024- Laura Jewell NP - 60 yo female here for 1 month follow-up alcohol induced cirrhosis - Still getting very tired - Taking pantoprazole 40 bid, nadolol 20 qd, furosemide 20 mg qd, hydroxyzine 25 qd, spironolactone 50 qd, lactulose bid - Needs to start xifaxan having insurance issues - Has changed diet and makes her feel better - Only has BM every few days- needs to increase lactulose - No ETOH 6/14/2024 - Has not started AA at this time  10/21/24- Laura Jewell NP - 60 yo female here for 2 month follow-up alcohol induced cirrhosis - Taking- furosemide 20 mg QD, hydroxizine 25 qd,, spironolactone 50 mg qd, lactulose BID-TID, nadolol, 20, xifaxan bid - Still feeling very fatigued and tring to return to normal - No alcohol since 6/14/24- no formal program - Still having itching  11/22/24- Laura Jewell, NP -60 yo female here for 1 month follow-up alcohol induced cirrhosis - Last etoh 6/2024 - Feeling much better with less fatigue - Taking:xifaxn bid,nadolol 20, furosemide 20 qd, spironolactone 50 qd, lactulose bid, vitamin d 3 - No longer having itching

## 2025-05-15 NOTE — HPI-TODAY'S VISIT:
4/22/25- Laura Jewell, NP - 58 yo female here to follow-up alcohol induced cirrhosis - Last visit 11/22/24 - Recently hospitalized [facility] 4/16-4/19/25- Started vomiting blood, N/V, black stools and constipation. EGD ([facility]) 4/17/25- No esophageal varices. One non-bleeding cratered duodenal ulcer with no bleeding. mild gastritis Labs 4/16/25- wbc 12.7, hgb 7.4, hct 22, plt 101 na 137, cr 1.4, tbil 5, alp 117, ast 57, alt 32 ammonia 48 Received 2 units PRBC CT scan abdomen with contrast- colitis ascending and transverse colon. splenomegaly left kidney cyst. suggest mri 3 months - Ordered antibiotics - needs to start - Taking: lactulose BID, xifaxan bid, vitamin d, and nadolol 20 mg  Now - BM 2 times per day- small amount - Stools firm - denies melena, no dark stools - No n/v, heartburn, dysphagia,  - No abdominal - last etoh 6/2024   5/15/25- Laura Jewell, NP - 58 yo female here to follow-up cirrhosis - Referred to [place] [facility], consult scheduled tomorrow with Dr. Nguyen - Having a lot of fatigue - No alcohol - Last dental visit 8/2024- will schedule cleaning today - Needs colonoscopy and EGD - No recent mammogram - Taking: D3 2000 u, Lactulose 2-3 doses until 2-3 good BM,nadolol 20 mg daily, xifaxan bid, pantoprazole 40 mg daily

## 2025-05-19 ENCOUNTER — WEB ENCOUNTER (OUTPATIENT)
Dept: URBAN - METROPOLITAN AREA CLINIC 98 | Facility: CLINIC | Age: 60
End: 2025-05-19

## 2025-05-20 ENCOUNTER — LAB OUTSIDE AN ENCOUNTER (OUTPATIENT)
Dept: URBAN - METROPOLITAN AREA CLINIC 98 | Facility: CLINIC | Age: 60
End: 2025-05-20

## 2025-05-23 ENCOUNTER — OFFICE VISIT (OUTPATIENT)
Dept: URBAN - METROPOLITAN AREA MEDICAL CENTER 28 | Facility: MEDICAL CENTER | Age: 60
End: 2025-05-23
Payer: COMMERCIAL

## 2025-05-23 DIAGNOSIS — K74.60 CIRRHOSIS: ICD-10-CM

## 2025-05-23 DIAGNOSIS — I85.10 SECONDARY ESOPHAGEAL VARICES WITHOUT BLEEDING: ICD-10-CM

## 2025-05-23 DIAGNOSIS — K29.50 CHRONIC GASTRITIS: ICD-10-CM

## 2025-05-23 DIAGNOSIS — Z12.11 COLON CANCER SCREENING: ICD-10-CM

## 2025-05-23 PROCEDURE — G0121 COLON CA SCRN NOT HI RSK IND: HCPCS | Performed by: INTERNAL MEDICINE

## 2025-05-23 PROCEDURE — 43239 EGD BIOPSY SINGLE/MULTIPLE: CPT | Performed by: INTERNAL MEDICINE

## 2025-05-23 PROCEDURE — 0528F RCMND FLW-UP 10 YRS DOCD: CPT | Performed by: INTERNAL MEDICINE

## 2025-05-23 RX ORDER — PREDNISONE 20 MG/1
TAKE 3 TABLETS BY MOUTH ONCE DAILY TABLET ORAL
Qty: 15 EACH | Refills: 0 | Status: ON HOLD | COMMUNITY

## 2025-05-23 RX ORDER — LOSARTAN POTASSIUM 100 MG/1
TAKE 1 TABLET BY MOUTH IN THE MORNING TABLET, FILM COATED ORAL
Qty: 30 EACH | Refills: 5 | COMMUNITY

## 2025-05-23 RX ORDER — ALBUTEROL SULFATE 90 UG/1
INHALE 1 TO 2 PUFFS BY MOUTH EVERY 4 HOURS AS NEEDED FOR WHEEZING AEROSOL, METERED RESPIRATORY (INHALATION)
Qty: 18 GRAM | Refills: 0 | Status: ON HOLD | COMMUNITY

## 2025-05-23 RX ORDER — PANTOPRAZOLE SODIUM 40 MG/1
1 TABLET TABLET, DELAYED RELEASE ORAL TWICE A DAY
Qty: 60 TABLET | Refills: 0 | Status: ON HOLD | COMMUNITY
Start: 2024-08-09

## 2025-05-23 RX ORDER — PROMETHAZINE HYDROCHLORIDE 25 MG/1
TAKE 1 TABLET BY MOUTH EVERY 4 HOURS AS NEEDED FOR NAUSEA AND VOMITING TABLET ORAL
Qty: 12 EACH | Refills: 0 | Status: ON HOLD | COMMUNITY

## 2025-05-23 RX ORDER — METHYLPREDNISOLONE 4 MG/1
TAKE BY MOUTH AS DIRECTED ON INSIDE OF PACKAGE TABLET ORAL
Qty: 21 EACH | Refills: 0 | Status: ON HOLD | COMMUNITY

## 2025-05-23 RX ORDER — ONDANSETRON 4 MG/1
DISSOLVE 1 TABLET IN MOUTH EVERY 8 HOURS AS NEEDED FOR NAUSEA AND VOMITING TABLET, ORALLY DISINTEGRATING ORAL
Qty: 12 EACH | Refills: 0 | Status: ON HOLD | COMMUNITY

## 2025-05-23 RX ORDER — PROMETHAZINE HYDROCHLORIDE AND DEXTROMETHORPHAN HYDROBROMIDE 6.25; 15 MG/5ML; MG/5ML
TAKE 5 ML BY MOUTH 4 TIMES DAILY AS NEEDED FOR COUGH SOLUTION ORAL
Qty: 118 MILLILITER | Refills: 0 | Status: ON HOLD | COMMUNITY

## 2025-05-23 RX ORDER — PANTOPRAZOLE 40 MG/1
TAKE 1 TABLET BY MOUTH ONCE DAILY TABLET, DELAYED RELEASE ORAL
Qty: 30 EACH | Refills: 0 | Status: ACTIVE | COMMUNITY

## 2025-05-23 RX ORDER — LORAZEPAM 0.5 MG/1
TAKE 1 TABLET BY MOUTH TWICE DAILY AS NEEDED FOR ANXIETY TABLET ORAL
Qty: 14 EACH | Refills: 0 | Status: ON HOLD | COMMUNITY

## 2025-05-23 RX ORDER — NADOLOL 20 MG/1
1 TABLET TABLET ORAL ONCE A DAY
Qty: 90 TABLET | Refills: 1 | Status: ACTIVE | COMMUNITY
Start: 2024-08-09

## 2025-05-23 RX ORDER — HYDROXYZINE HYDROCHLORIDE 25 MG/1
1 TABLET AS NEEDED TABLET, FILM COATED ORAL ONCE A DAY
Status: ON HOLD | COMMUNITY
Start: 2024-07-29

## 2025-05-23 RX ORDER — RIFAXIMIN 550 MG/1
1 TABLET TABLET ORAL TWICE A DAY
Qty: 180 TABLET | Refills: 3 | Status: ACTIVE | COMMUNITY
Start: 2024-08-09

## 2025-05-23 RX ORDER — BENZONATATE 100 MG/1
TAKE 1 CAPSULE BY MOUTH THREE TIMES DAILY AS NEEDED FOR COUGH CAPSULE ORAL
Qty: 21 EACH | Refills: 0 | Status: ON HOLD | COMMUNITY

## 2025-05-23 RX ORDER — NICOTINE 14 MG/24H
APPLY 1 PATCH TOPICALLY ONCE DAILY PATCH, EXTENDED RELEASE TRANSDERMAL
Qty: 28 EACH | Refills: 0 | Status: ON HOLD | COMMUNITY

## 2025-05-23 RX ORDER — LACTULOSE 10 G/15ML
15 ML AS NEEDED SOLUTION ORAL
Qty: 1350 ML | Refills: 5 | Status: ACTIVE | COMMUNITY
Start: 2025-05-15

## 2025-05-23 RX ORDER — AZITHROMYCIN 250 MG/1
TAKE 2 TABLETS BY MOUTH ON DAY 1, AND THEN TAKE 1 TABLET BY MOUTH ONCE A DAY ON DAY 2 THROUGH DAY 5 TABLET, FILM COATED ORAL
Qty: 6 EACH | Refills: 0 | Status: ON HOLD | COMMUNITY

## 2025-05-23 RX ORDER — FUROSEMIDE 20 MG/1
TAKE 1 TABLET BY MOUTH ONCE DAILY FOR 30 DAYS TABLET ORAL
Qty: 30 TABLET | Refills: 11 | Status: ON HOLD | COMMUNITY

## 2025-05-23 RX ORDER — LOSARTAN POTASSIUM 100 MG/1
TAKE 1 TABLET BY MOUTH IN THE MORNING TABLET, FILM COATED ORAL
Qty: 30 EACH | Refills: 5 | Status: ON HOLD | COMMUNITY

## 2025-05-23 RX ORDER — ALBUTEROL SULFATE 90 UG/1
AEROSOL, METERED RESPIRATORY (INHALATION)
Qty: 18 UNSPECIFIED | Status: ON HOLD | COMMUNITY

## 2025-05-27 ENCOUNTER — TELEPHONE ENCOUNTER (OUTPATIENT)
Dept: URBAN - METROPOLITAN AREA CLINIC 98 | Facility: CLINIC | Age: 60
End: 2025-05-27

## 2025-06-02 ENCOUNTER — WEB ENCOUNTER (OUTPATIENT)
Dept: URBAN - METROPOLITAN AREA CLINIC 98 | Facility: CLINIC | Age: 60
End: 2025-06-02

## 2025-06-03 ENCOUNTER — WEB ENCOUNTER (OUTPATIENT)
Dept: URBAN - METROPOLITAN AREA CLINIC 98 | Facility: CLINIC | Age: 60
End: 2025-06-03

## 2025-06-04 ENCOUNTER — TELEPHONE ENCOUNTER (OUTPATIENT)
Dept: URBAN - METROPOLITAN AREA CLINIC 98 | Facility: CLINIC | Age: 60
End: 2025-06-04

## 2025-06-10 ENCOUNTER — OFFICE VISIT (OUTPATIENT)
Dept: URBAN - METROPOLITAN AREA TELEHEALTH 2 | Facility: TELEHEALTH | Age: 60
End: 2025-06-10
Payer: COMMERCIAL

## 2025-06-10 DIAGNOSIS — K76.6 PORTAL HYPERTENSION: ICD-10-CM

## 2025-06-10 DIAGNOSIS — K26.9 DUODENAL ULCER: ICD-10-CM

## 2025-06-10 DIAGNOSIS — K76.82 HEPATIC ENCEPHALOPATHY: ICD-10-CM

## 2025-06-10 DIAGNOSIS — K76.0 HEPATIC STEATOSIS: ICD-10-CM

## 2025-06-10 DIAGNOSIS — Z12.11 SCREENING FOR COLON CANCER: ICD-10-CM

## 2025-06-10 DIAGNOSIS — I85.10 SECONDARY ESOPHAGEAL VARICES WITHOUT BLEEDING: ICD-10-CM

## 2025-06-10 DIAGNOSIS — K70.31 ALCOHOLIC CIRRHOSIS OF LIVER WITH ASCITES: ICD-10-CM

## 2025-06-10 DIAGNOSIS — D69.6 THROMBOCYTOPENIA: ICD-10-CM

## 2025-06-10 DIAGNOSIS — R79.89 ELEVATED FERRITIN: ICD-10-CM

## 2025-06-10 DIAGNOSIS — Z90.49 HISTORY OF RESECTION OF LIVER: ICD-10-CM

## 2025-06-10 PROCEDURE — 99213 OFFICE O/P EST LOW 20 MIN: CPT

## 2025-06-10 RX ORDER — PANTOPRAZOLE SODIUM 40 MG/1
1 TABLET TABLET, DELAYED RELEASE ORAL TWICE A DAY
Qty: 60 TABLET | Refills: 0 | Status: ON HOLD | COMMUNITY
Start: 2024-08-09

## 2025-06-10 RX ORDER — BENZONATATE 100 MG/1
TAKE 1 CAPSULE BY MOUTH THREE TIMES DAILY AS NEEDED FOR COUGH CAPSULE ORAL
Qty: 21 EACH | Refills: 0 | Status: ON HOLD | COMMUNITY

## 2025-06-10 RX ORDER — LOSARTAN POTASSIUM 100 MG/1
TAKE 1 TABLET BY MOUTH IN THE MORNING TABLET, FILM COATED ORAL
Qty: 30 EACH | Refills: 5 | Status: ACTIVE | COMMUNITY

## 2025-06-10 RX ORDER — LOSARTAN POTASSIUM 100 MG/1
TAKE 1 TABLET BY MOUTH IN THE MORNING TABLET, FILM COATED ORAL
Qty: 30 EACH | Refills: 5 | Status: ON HOLD | COMMUNITY

## 2025-06-10 RX ORDER — RIFAXIMIN 550 MG/1
1 TABLET TABLET ORAL TWICE A DAY
Qty: 180 TABLET | Refills: 3 | Status: ACTIVE | COMMUNITY
Start: 2024-08-09

## 2025-06-10 RX ORDER — PROMETHAZINE HYDROCHLORIDE 25 MG/1
TAKE 1 TABLET BY MOUTH EVERY 4 HOURS AS NEEDED FOR NAUSEA AND VOMITING TABLET ORAL
Qty: 12 EACH | Refills: 0 | Status: ON HOLD | COMMUNITY

## 2025-06-10 RX ORDER — PANTOPRAZOLE 40 MG/1
TAKE 1 TABLET BY MOUTH ONCE DAILY TABLET, DELAYED RELEASE ORAL
Qty: 30 EACH | Refills: 0 | Status: ACTIVE | COMMUNITY

## 2025-06-10 RX ORDER — FUROSEMIDE 20 MG/1
TAKE 1 TABLET BY MOUTH ONCE DAILY FOR 30 DAYS TABLET ORAL
Qty: 30 TABLET | Refills: 11 | Status: ON HOLD | COMMUNITY

## 2025-06-10 RX ORDER — HYDROXYZINE HYDROCHLORIDE 25 MG/1
1 TABLET AS NEEDED TABLET, FILM COATED ORAL ONCE A DAY
Status: ON HOLD | COMMUNITY
Start: 2024-07-29

## 2025-06-10 RX ORDER — PROMETHAZINE HYDROCHLORIDE AND DEXTROMETHORPHAN HYDROBROMIDE 6.25; 15 MG/5ML; MG/5ML
TAKE 5 ML BY MOUTH 4 TIMES DAILY AS NEEDED FOR COUGH SOLUTION ORAL
Qty: 118 MILLILITER | Refills: 0 | Status: ON HOLD | COMMUNITY

## 2025-06-10 RX ORDER — METHYLPREDNISOLONE 4 MG/1
TAKE BY MOUTH AS DIRECTED ON INSIDE OF PACKAGE TABLET ORAL
Qty: 21 EACH | Refills: 0 | Status: ON HOLD | COMMUNITY

## 2025-06-10 RX ORDER — ALBUTEROL SULFATE 90 UG/1
INHALE 1 TO 2 PUFFS BY MOUTH EVERY 4 HOURS AS NEEDED FOR WHEEZING AEROSOL, METERED RESPIRATORY (INHALATION)
Qty: 18 GRAM | Refills: 0 | Status: ON HOLD | COMMUNITY

## 2025-06-10 RX ORDER — PREDNISONE 20 MG/1
TAKE 3 TABLETS BY MOUTH ONCE DAILY TABLET ORAL
Qty: 15 EACH | Refills: 0 | Status: ON HOLD | COMMUNITY

## 2025-06-10 RX ORDER — AZITHROMYCIN 250 MG/1
TAKE 2 TABLETS BY MOUTH ON DAY 1, AND THEN TAKE 1 TABLET BY MOUTH ONCE A DAY ON DAY 2 THROUGH DAY 5 TABLET, FILM COATED ORAL
Qty: 6 EACH | Refills: 0 | Status: ON HOLD | COMMUNITY

## 2025-06-10 RX ORDER — ONDANSETRON 4 MG/1
DISSOLVE 1 TABLET IN MOUTH EVERY 8 HOURS AS NEEDED FOR NAUSEA AND VOMITING TABLET, ORALLY DISINTEGRATING ORAL
Qty: 12 EACH | Refills: 0 | Status: ON HOLD | COMMUNITY

## 2025-06-10 RX ORDER — NICOTINE 14 MG/24H
APPLY 1 PATCH TOPICALLY ONCE DAILY PATCH, EXTENDED RELEASE TRANSDERMAL
Qty: 28 EACH | Refills: 0 | Status: ON HOLD | COMMUNITY

## 2025-06-10 RX ORDER — LORAZEPAM 0.5 MG/1
TAKE 1 TABLET BY MOUTH TWICE DAILY AS NEEDED FOR ANXIETY TABLET ORAL
Qty: 14 EACH | Refills: 0 | Status: ON HOLD | COMMUNITY

## 2025-06-10 RX ORDER — NADOLOL 20 MG/1
1 TABLET TABLET ORAL ONCE A DAY
Qty: 90 TABLET | Refills: 1 | Status: ACTIVE | COMMUNITY
Start: 2024-08-09

## 2025-06-10 RX ORDER — LACTULOSE 10 G/15ML
15 ML AS NEEDED SOLUTION ORAL
Qty: 1350 ML | Refills: 5 | Status: ACTIVE | COMMUNITY
Start: 2025-05-15

## 2025-06-10 RX ORDER — ALBUTEROL SULFATE 90 UG/1
AEROSOL, METERED RESPIRATORY (INHALATION)
Qty: 18 UNSPECIFIED | Status: ON HOLD | COMMUNITY

## 2025-06-10 NOTE — HPI-TODAY'S VISIT:
6/10/2025- Laura Jewell NP - 60 yo female present for telehealth to follow-up cirrhosis - Undergoing work-up with [facility]- with Dr. Dr. Thayer - Next visit with [facility] tomorrow.  Awaiting imaging. Per patient was told should be approved  - Taking: D3 2000 u, Lactulose 2-3 doses until 2-3 good BM,nadolol 20 mg daily, xifaxan bid, pantoprazole 40 mg daily - 5/23/2025 colonoscopy ([facility])- - Internal hemorrhoids. Diverticulosis in the recto-sigmoid colon. The examined portion of the ileum was normal. No specimens collected. Repeat 10 years - 5/23/2025 EGD ([facility])- - Small (< 5 mm) esophageal varices. Gastritis, characterized by erythema. Biopsied,  inactive chronic gastritis. Normal examined duodenum. Recommended repeat 1 year

## 2025-06-10 NOTE — HPI-OTHER HISTORIES
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 For HEMOSTASIS, hemostatic spray deployed. No specimens collected. - EGD 6/14/24- examined duodenum normal with clotted blood and bile, large amount of clots and blood in gastric body, trace varices lower esophagus, not bleeding, esophagitis with no bleeding, small hiatal hernia. No gastric varices. Few superfiicial non bleeding antral ulcerations. No active bleeding noted during exam. No specimens collected. - No ETOH since hospital discharge - Takes vitamin d, spironolactone 50 qd, furosemide 20 QD, lactulose 30 ml bid, [id number] qd, pantoprazole 40 bid - Labs 7/5/2024- hgb 9.2, mcv 97, plt 78, na 141, k 3.8, cr 0.7, albumin 2.4, tbil 3.5, alp 111, ast 65, alt 29, mag 1.8,  8/9/2024- Laura Jewell NP - 58 yo female here for 1 month follow-up alcohol induced cirrhosis - Still getting very tired - Taking pantoprazole 40 bid, nadolol 20 qd, furosemide 20 mg qd, hydroxyzine 25 qd, spironolactone 50 qd, lactulose bid - Needs to start xifaxan having insurance issues - Has changed diet and makes her feel better - Only has BM every few days- needs to increase lactulose - No ETOH 6/14/2024 - Has not started AA at this time  10/21/24- Laura Jewell NP - 58 yo female here for 2 month follow-up alcohol induced cirrhosis - Taking- furosemide 20 mg QD, hydroxizine 25 qd,, spironolactone 50 mg qd, lactulose BID-TID, nadolol, 20, xifaxan bid - Still feeling very fatigued and tring to return to normal - No alcohol since 6/14/24- no formal program - Still having itching  11/22/24- Laura Jewell NP -58 yo female here for 1 month follow-up alcohol induced cirrhosis - Last etoh 6/2024 - Feeling much better with less fatigue - Taking:xifaxn bid,nadolol 20, furosemide 20 qd, spironolactone 50 qd, lactulose bid, vitamin d 3 - No longer having itching  4/22/25- Laura Jewell NP - 58 yo female here to follow-up alcohol induced cirrhosis - Last visit 11/22/24 - Recently hospitalized [facility] 4/16-4/19/25- Started vomiting blood, N/V, black stools and constipation. EGD ([facility]) 4/17/25- No esophageal varices. One non-bleeding cratered duodenal ulcer with no bleeding. mild gastritis Labs 4/16/25- wbc 12.7, hgb 7.4, hct 22, plt 101 na 137, cr 1.4, tbil 5, alp 117, ast 57, alt 32 ammonia 48 Received 2 units PRBC CT scan abdomen with contrast- colitis ascending and transverse colon. splenomegaly left kidney cyst. suggest mri 3 months - Ordered antibiotics - needs to start - Taking: lactulose BID, xifaxan bid, vitamin d, and nadolol 20 mg  Now - BM 2 times per day- small amount - Stools firm - denies melena, no dark stools - No n/v, heartburn, dysphagia,  - No abdominal - last etoh 6/2024  5/15/25- Laura Jewell NP - 58 yo female here to follow-up cirrhosis - Referred to [facility], consult scheduled tomorrow with Dr. Nguyen - Having a lot of fatigue - No alcohol - Last dental visit 8/2024- will schedule cleaning today - Needs colonoscopy and EGD - No recent mammogram - Taking: D3 2000 u, Lactulose 2-3 doses until 2-3 good BM,nadolol 20 mg daily, xifaxan bid, pantoprazole 40 mg daily

## 2025-06-18 ENCOUNTER — TELEPHONE ENCOUNTER (OUTPATIENT)
Dept: URBAN - METROPOLITAN AREA CLINIC 98 | Facility: CLINIC | Age: 60
End: 2025-06-18

## 2025-06-23 ENCOUNTER — OFFICE VISIT (OUTPATIENT)
Dept: URBAN - METROPOLITAN AREA CLINIC 98 | Facility: CLINIC | Age: 60
End: 2025-06-23

## 2025-06-23 RX ORDER — NICOTINE 14 MG/24H
APPLY 1 PATCH TOPICALLY ONCE DAILY PATCH, EXTENDED RELEASE TRANSDERMAL
Qty: 28 EACH | Refills: 0 | Status: ON HOLD | COMMUNITY

## 2025-06-23 RX ORDER — RIFAXIMIN 550 MG/1
1 TABLET TABLET ORAL TWICE A DAY
Qty: 180 TABLET | Refills: 3 | Status: ACTIVE | COMMUNITY
Start: 2024-08-09

## 2025-06-23 RX ORDER — BENZONATATE 100 MG/1
TAKE 1 CAPSULE BY MOUTH THREE TIMES DAILY AS NEEDED FOR COUGH CAPSULE ORAL
Qty: 21 EACH | Refills: 0 | Status: ON HOLD | COMMUNITY

## 2025-06-23 RX ORDER — METHYLPREDNISOLONE 4 MG/1
TAKE BY MOUTH AS DIRECTED ON INSIDE OF PACKAGE TABLET ORAL
Qty: 21 EACH | Refills: 0 | Status: ON HOLD | COMMUNITY

## 2025-06-23 RX ORDER — LACTULOSE 10 G/15ML
15 ML AS NEEDED SOLUTION ORAL
Qty: 1350 ML | Refills: 5 | Status: ACTIVE | COMMUNITY
Start: 2025-05-15

## 2025-06-23 RX ORDER — PANTOPRAZOLE 40 MG/1
TAKE 1 TABLET BY MOUTH ONCE DAILY TABLET, DELAYED RELEASE ORAL
Qty: 30 EACH | Refills: 0 | Status: ACTIVE | COMMUNITY

## 2025-06-23 RX ORDER — LOSARTAN POTASSIUM 100 MG/1
TAKE 1 TABLET BY MOUTH IN THE MORNING TABLET, FILM COATED ORAL
Qty: 30 EACH | Refills: 5 | Status: ON HOLD | COMMUNITY

## 2025-06-23 RX ORDER — HYDROXYZINE HYDROCHLORIDE 25 MG/1
1 TABLET AS NEEDED TABLET, FILM COATED ORAL ONCE A DAY
Status: ON HOLD | COMMUNITY
Start: 2024-07-29

## 2025-06-23 RX ORDER — ONDANSETRON 4 MG/1
DISSOLVE 1 TABLET IN MOUTH EVERY 8 HOURS AS NEEDED FOR NAUSEA AND VOMITING TABLET, ORALLY DISINTEGRATING ORAL
Qty: 12 EACH | Refills: 0 | Status: ON HOLD | COMMUNITY

## 2025-06-23 RX ORDER — FUROSEMIDE 20 MG/1
TAKE 1 TABLET BY MOUTH ONCE DAILY FOR 30 DAYS TABLET ORAL
Qty: 30 TABLET | Refills: 11 | Status: ON HOLD | COMMUNITY

## 2025-06-23 RX ORDER — AZITHROMYCIN 250 MG/1
TAKE 2 TABLETS BY MOUTH ON DAY 1, AND THEN TAKE 1 TABLET BY MOUTH ONCE A DAY ON DAY 2 THROUGH DAY 5 TABLET, FILM COATED ORAL
Qty: 6 EACH | Refills: 0 | Status: ON HOLD | COMMUNITY

## 2025-06-23 RX ORDER — ALBUTEROL SULFATE 90 UG/1
AEROSOL, METERED RESPIRATORY (INHALATION)
Qty: 18 UNSPECIFIED | Status: ON HOLD | COMMUNITY

## 2025-06-23 RX ORDER — NADOLOL 20 MG/1
1 TABLET TABLET ORAL ONCE A DAY
Qty: 90 TABLET | Refills: 1 | Status: ACTIVE | COMMUNITY
Start: 2024-08-09

## 2025-06-23 RX ORDER — LORAZEPAM 0.5 MG/1
TAKE 1 TABLET BY MOUTH TWICE DAILY AS NEEDED FOR ANXIETY TABLET ORAL
Qty: 14 EACH | Refills: 0 | Status: ON HOLD | COMMUNITY

## 2025-06-23 RX ORDER — ALBUTEROL SULFATE 90 UG/1
INHALE 1 TO 2 PUFFS BY MOUTH EVERY 4 HOURS AS NEEDED FOR WHEEZING AEROSOL, METERED RESPIRATORY (INHALATION)
Qty: 18 GRAM | Refills: 0 | Status: ON HOLD | COMMUNITY

## 2025-06-23 RX ORDER — PROMETHAZINE HYDROCHLORIDE AND DEXTROMETHORPHAN HYDROBROMIDE 6.25; 15 MG/5ML; MG/5ML
TAKE 5 ML BY MOUTH 4 TIMES DAILY AS NEEDED FOR COUGH SOLUTION ORAL
Qty: 118 MILLILITER | Refills: 0 | Status: ON HOLD | COMMUNITY

## 2025-06-23 RX ORDER — PREDNISONE 20 MG/1
TAKE 3 TABLETS BY MOUTH ONCE DAILY TABLET ORAL
Qty: 15 EACH | Refills: 0 | Status: ON HOLD | COMMUNITY

## 2025-06-23 RX ORDER — PROMETHAZINE HYDROCHLORIDE 25 MG/1
TAKE 1 TABLET BY MOUTH EVERY 4 HOURS AS NEEDED FOR NAUSEA AND VOMITING TABLET ORAL
Qty: 12 EACH | Refills: 0 | Status: ON HOLD | COMMUNITY

## 2025-06-23 RX ORDER — PANTOPRAZOLE SODIUM 40 MG/1
1 TABLET TABLET, DELAYED RELEASE ORAL TWICE A DAY
Qty: 60 TABLET | Refills: 0 | Status: ON HOLD | COMMUNITY
Start: 2024-08-09

## 2025-06-23 NOTE — HPI-TODAY'S VISIT:
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

## 2025-06-23 NOTE — HPI-OTHER HISTORIES
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 For HEMOSTASIS, hemostatic spray deployed. No specimens collected. - EGD 6/14/24- examined duodenum normal with clotted blood and bile, large amount of clots and blood in gastric body, trace varices lower esophagus, not bleeding, esophagitis with no bleeding, small hiatal hernia. No gastric varices. Few superfiicial non bleeding antral ulcerations. No active bleeding noted during exam. No specimens collected. - No ETOH since hospital discharge - Takes vitamin d, spironolactone 50 qd, furosemide 20 QD, lactulose 30 ml bid, [id number] qd, pantoprazole 40 bid - Labs 7/5/2024- hgb 9.2, mcv 97, plt 78, na 141, k 3.8, cr 0.7, albumin 2.4, tbil 3.5, alp 111, ast 65, alt 29, mag 1.8,  8/9/2024- Laura Jewell NP - 60 yo female here for 1 month follow-up alcohol induced cirrhosis - Still getting very tired - Taking pantoprazole 40 bid, nadolol 20 qd, furosemide 20 mg qd, hydroxyzine 25 qd, spironolactone 50 qd, lactulose bid - Needs to start xifaxan having insurance issues - Has changed diet and makes her feel better - Only has BM every few days- needs to increase lactulose - No ETOH 6/14/2024 - Has not started AA at this time  10/21/24- Laura Jewell NP - 60 yo female here for 2 month follow-up alcohol induced cirrhosis - Taking- furosemide 20 mg QD, hydroxizine 25 qd,, spironolactone 50 mg qd, lactulose BID-TID, nadolol, 20, xifaxan bid - Still feeling very fatigued and tring to return to normal - No alcohol since 6/14/24- no formal program - Still having itching  11/22/24- Laura Jewell NP -60 yo female here for 1 month follow-up alcohol induced cirrhosis - Last etoh 6/2024 - Feeling much better with less fatigue - Taking:xifaxn bid,nadolol 20, furosemide 20 qd, spironolactone 50 qd, lactulose bid, vitamin d 3 - No longer having itching  4/22/25- Laura Jewell NP - 60 yo female here to follow-up alcohol induced cirrhosis - Last visit 11/22/24 - Recently hospitalized [facility] 4/16-4/19/25- Started vomiting blood, N/V, black stools and constipation. EGD ([facility]) 4/17/25- No esophageal varices. One non-bleeding cratered duodenal ulcer with no bleeding. mild gastritis Labs 4/16/25- wbc 12.7, hgb 7.4, hct 22, plt 101 na 137, cr 1.4, tbil 5, alp 117, ast 57, alt 32 ammonia 48 Received 2 units PRBC CT scan abdomen with contrast- colitis ascending and transverse colon. splenomegaly left kidney cyst. suggest mri 3 months - Ordered antibiotics - needs to start - Taking: lactulose BID, xifaxan bid, vitamin d, and nadolol 20 mg  Now - BM 2 times per day- small amount - Stools firm - denies melena, no dark stools - No n/v, heartburn, dysphagia,  - No abdominal - last etoh 6/2024  5/15/25- Laura Jewell NP - 60 yo female here to follow-up cirrhosis - Referred to [place] [facility], consult scheduled tomorrow with Dr. Nguyen - Having a lot of fatigue - No alcohol - Last dental visit 8/2024- will schedule cleaning today - Needs colonoscopy and EGD - No recent mammogram - Taking: D3 2000 u, Lactulose 2-3 doses until 2-3 good BM,nadolol 20 mg daily, xifaxan bid, pantoprazole 40 mg daily  6/10/2025- Laura Jewell NP - 60 yo female present for telehealth to follow-up cirrhosis - Undergoing work-up with [place] transplant- with Dr. Dr. Thayer - Next visit with [place] tomorrow.  Awaiting imaging. Per patient was told should be approved  - Taking: D3 2000 u, Lactulose 2-3 doses until 2-3 good BM,nadolol 20 mg daily, xifaxan bid, pantoprazole 40 mg daily - 5/23/2025 colonoscopy ([facility])- - Internal hemorrhoids. Diverticulosis in the recto-sigmoid colon. The examined portion of the ileum was normal. No specimens collected. Repeat 10 years - 5/23/2025 EGD ([facility])- - Small (< 5 mm) esophageal varices. Gastritis, characterized by erythema. Biopsied,  inactive chronic gastritis. Normal examined duodenum. Recommended repeat 1 year

## 2025-07-04 ENCOUNTER — TELEPHONE ENCOUNTER (OUTPATIENT)
Dept: URBAN - METROPOLITAN AREA CLINIC 98 | Facility: CLINIC | Age: 60
End: 2025-07-04

## 2025-07-05 ENCOUNTER — LAB OUTSIDE AN ENCOUNTER (OUTPATIENT)
Dept: URBAN - METROPOLITAN AREA CLINIC 98 | Facility: CLINIC | Age: 60
End: 2025-07-05

## 2025-07-09 LAB
ALBUMIN: 3
ALKALINE PHOSPHATASE: 258
ALT (SGPT): 35
AST (SGOT): 66
BASO (ABSOLUTE): 0
BASOS: 1
BILIRUBIN, DIRECT: 2.37
BILIRUBIN, TOTAL: 6
BUN/CREATININE RATIO: 18
BUN: 11
CALCIUM: 8.1
CARBON DIOXIDE, TOTAL: 21
CHLORIDE: 105
CREATININE: 0.62
EGFR: 102
EOS (ABSOLUTE): 0.1
EOS: 4
GLOBULIN, TOTAL: 3.7
GLUCOSE: 85
HEMATOCRIT: 28.6
HEMATOLOGY COMMENTS:: (no result)
HEMOGLOBIN: 10.2
IMMATURE CELLS: (no result)
IMMATURE GRANS (ABS): 0
IMMATURE GRANULOCYTES: 0
INR: 1.6
LYMPHS (ABSOLUTE): 1.1
LYMPHS: 37
MCH: 33.6
MCHC: 35.7
MCV: 94
MONOCYTES(ABSOLUTE): 0.3
MONOCYTES: 10
NEUTROPHILS (ABSOLUTE): 1.4
NEUTROPHILS: 48
NRBC: (no result)
PLATELETS: 66
POTASSIUM: 4.2
PROTEIN, TOTAL: 6.7
PROTHROMBIN TIME: 17.3
RBC: 3.04
RDW: 14.8
SODIUM: 136
WBC: 2.9

## 2025-07-16 ENCOUNTER — OFFICE VISIT (OUTPATIENT)
Dept: URBAN - METROPOLITAN AREA CLINIC 98 | Facility: CLINIC | Age: 60
End: 2025-07-16
Payer: COMMERCIAL

## 2025-07-16 DIAGNOSIS — F17.200 CURRENT SMOKER: ICD-10-CM

## 2025-07-16 DIAGNOSIS — Z90.49 HISTORY OF RESECTION OF LIVER: ICD-10-CM

## 2025-07-16 DIAGNOSIS — Z12.11 SCREENING FOR COLON CANCER: ICD-10-CM

## 2025-07-16 DIAGNOSIS — K76.82 HEPATIC ENCEPHALOPATHY: ICD-10-CM

## 2025-07-16 DIAGNOSIS — K70.31 ALCOHOLIC CIRRHOSIS OF LIVER WITH ASCITES: ICD-10-CM

## 2025-07-16 DIAGNOSIS — D69.6 THROMBOCYTOPENIA: ICD-10-CM

## 2025-07-16 DIAGNOSIS — F10.11 ALCOHOL ABUSE, IN REMISSION: ICD-10-CM

## 2025-07-16 DIAGNOSIS — K76.6 PORTAL HYPERTENSION: ICD-10-CM

## 2025-07-16 DIAGNOSIS — K76.0 HEPATIC STEATOSIS: ICD-10-CM

## 2025-07-16 DIAGNOSIS — I85.10 SECONDARY ESOPHAGEAL VARICES WITHOUT BLEEDING: ICD-10-CM

## 2025-07-16 PROBLEM — 77176002: Status: ACTIVE | Noted: 2025-07-16

## 2025-07-16 PROCEDURE — 99213 OFFICE O/P EST LOW 20 MIN: CPT

## 2025-07-16 RX ORDER — NICOTINE 14 MG/24H
APPLY 1 PATCH TOPICALLY ONCE DAILY PATCH, EXTENDED RELEASE TRANSDERMAL
Qty: 28 EACH | Refills: 0 | Status: ON HOLD | COMMUNITY

## 2025-07-16 RX ORDER — AZITHROMYCIN 250 MG/1
TAKE 2 TABLETS BY MOUTH ON DAY 1, AND THEN TAKE 1 TABLET BY MOUTH ONCE A DAY ON DAY 2 THROUGH DAY 5 TABLET, FILM COATED ORAL
Qty: 6 EACH | Refills: 0 | Status: ON HOLD | COMMUNITY

## 2025-07-16 RX ORDER — ONDANSETRON 4 MG/1
DISSOLVE 1 TABLET IN MOUTH EVERY 8 HOURS AS NEEDED FOR NAUSEA AND VOMITING TABLET, ORALLY DISINTEGRATING ORAL
Qty: 12 EACH | Refills: 0 | Status: ON HOLD | COMMUNITY

## 2025-07-16 RX ORDER — LACTULOSE 10 G/15ML
15 ML AS NEEDED SOLUTION ORAL
Qty: 1350 ML | Refills: 5 | Status: ACTIVE | COMMUNITY
Start: 2025-05-15

## 2025-07-16 RX ORDER — LORAZEPAM 0.5 MG/1
TAKE 1 TABLET BY MOUTH TWICE DAILY AS NEEDED FOR ANXIETY TABLET ORAL
Qty: 14 EACH | Refills: 0 | Status: ON HOLD | COMMUNITY

## 2025-07-16 RX ORDER — METHYLPREDNISOLONE 4 MG/1
TAKE BY MOUTH AS DIRECTED ON INSIDE OF PACKAGE TABLET ORAL
Qty: 21 EACH | Refills: 0 | Status: ON HOLD | COMMUNITY

## 2025-07-16 RX ORDER — RIFAXIMIN 550 MG/1
1 TABLET TABLET ORAL TWICE A DAY
Qty: 180 TABLET | Refills: 3 | Status: ACTIVE | COMMUNITY
Start: 2024-08-09

## 2025-07-16 RX ORDER — PROMETHAZINE HYDROCHLORIDE 25 MG/1
TAKE 1 TABLET BY MOUTH EVERY 4 HOURS AS NEEDED FOR NAUSEA AND VOMITING TABLET ORAL
Qty: 12 EACH | Refills: 0 | Status: ON HOLD | COMMUNITY

## 2025-07-16 RX ORDER — LOSARTAN POTASSIUM 100 MG/1
TAKE 1 TABLET BY MOUTH IN THE MORNING TABLET, FILM COATED ORAL
Qty: 30 EACH | Refills: 5 | Status: ON HOLD | COMMUNITY

## 2025-07-16 RX ORDER — BENZONATATE 100 MG/1
TAKE 1 CAPSULE BY MOUTH THREE TIMES DAILY AS NEEDED FOR COUGH CAPSULE ORAL
Qty: 21 EACH | Refills: 0 | Status: ON HOLD | COMMUNITY

## 2025-07-16 RX ORDER — ALBUTEROL SULFATE 90 UG/1
AEROSOL, METERED RESPIRATORY (INHALATION)
Qty: 18 UNSPECIFIED | Status: ON HOLD | COMMUNITY

## 2025-07-16 RX ORDER — FUROSEMIDE 20 MG/1
TAKE 1 TABLET BY MOUTH ONCE DAILY FOR 30 DAYS TABLET ORAL
Qty: 30 TABLET | Refills: 11 | Status: ON HOLD | COMMUNITY

## 2025-07-16 RX ORDER — NADOLOL 20 MG/1
1 TABLET TABLET ORAL ONCE A DAY
Qty: 90 TABLET | Refills: 1 | Status: ACTIVE | COMMUNITY
Start: 2024-08-09

## 2025-07-16 RX ORDER — PANTOPRAZOLE SODIUM 40 MG/1
1 TABLET TABLET, DELAYED RELEASE ORAL TWICE A DAY
Qty: 60 TABLET | Refills: 0 | Status: ON HOLD | COMMUNITY
Start: 2024-08-09

## 2025-07-16 RX ORDER — PROMETHAZINE HYDROCHLORIDE AND DEXTROMETHORPHAN HYDROBROMIDE 6.25; 15 MG/5ML; MG/5ML
TAKE 5 ML BY MOUTH 4 TIMES DAILY AS NEEDED FOR COUGH SOLUTION ORAL
Qty: 118 MILLILITER | Refills: 0 | Status: ON HOLD | COMMUNITY

## 2025-07-16 RX ORDER — PREDNISONE 20 MG/1
TAKE 3 TABLETS BY MOUTH ONCE DAILY TABLET ORAL
Qty: 15 EACH | Refills: 0 | Status: ON HOLD | COMMUNITY

## 2025-07-16 RX ORDER — HYDROXYZINE HYDROCHLORIDE 25 MG/1
1 TABLET AS NEEDED TABLET, FILM COATED ORAL ONCE A DAY
Status: ON HOLD | COMMUNITY
Start: 2024-07-29

## 2025-07-16 RX ORDER — ALBUTEROL SULFATE 90 UG/1
INHALE 1 TO 2 PUFFS BY MOUTH EVERY 4 HOURS AS NEEDED FOR WHEEZING AEROSOL, METERED RESPIRATORY (INHALATION)
Qty: 18 GRAM | Refills: 0 | Status: ON HOLD | COMMUNITY

## 2025-07-16 RX ORDER — PANTOPRAZOLE 40 MG/1
TAKE 1 TABLET BY MOUTH ONCE DAILY TABLET, DELAYED RELEASE ORAL
Qty: 30 EACH | Refills: 0 | Status: ACTIVE | COMMUNITY

## 2025-07-16 NOTE — HPI-OTHER HISTORIES
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 For HEMOSTASIS, hemostatic spray deployed. No specimens collected. - EGD 6/14/24- examined duodenum normal with clotted blood and bile, large amount of clots and blood in gastric body, trace varices lower esophagus, not bleeding, esophagitis with no bleeding, small hiatal hernia. No gastric varices. Few superfiicial non bleeding antral ulcerations. No active bleeding noted during exam. No specimens collected. - No ETOH since hospital discharge - Takes vitamin d, spironolactone 50 qd, furosemide 20 QD, lactulose 30 ml bid, [id number] qd, pantoprazole 40 bid - Labs 7/5/2024- hgb 9.2, mcv 97, plt 78, na 141, k 3.8, cr 0.7, albumin 2.4, tbil 3.5, alp 111, ast 65, alt 29, mag 1.8,  8/9/2024- Laura Jewell NP - 60 yo female here for 1 month follow-up alcohol induced cirrhosis - Still getting very tired - Taking pantoprazole 40 bid, nadolol 20 qd, furosemide 20 mg qd, hydroxyzine 25 qd, spironolactone 50 qd, lactulose bid - Needs to start xifaxan having insurance issues - Has changed diet and makes her feel better - Only has BM every few days- needs to increase lactulose - No ETOH 6/14/2024 - Has not started AA at this time  10/21/24- Laura Jewell NP - 60 yo female here for 2 month follow-up alcohol induced cirrhosis - Taking- furosemide 20 mg QD, hydroxizine 25 qd,, spironolactone 50 mg qd, lactulose BID-TID, nadolol, 20, xifaxan bid - Still feeling very fatigued and tring to return to normal - No alcohol since 6/14/24- no formal program - Still having itching  11/22/24- Laura Jewell NP -60 yo female here for 1 month follow-up alcohol induced cirrhosis - Last etoh 6/2024 - Feeling much better with less fatigue - Taking:xifaxn bid,nadolol 20, furosemide 20 qd, spironolactone 50 qd, lactulose bid, vitamin d 3 - No longer having itching  4/22/25- Laura Jewell NP - 60 yo female here to follow-up alcohol induced cirrhosis - Last visit 11/22/24 - Recently hospitalized [facility] 4/16-4/19/25- Started vomiting blood, N/V, black stools and constipation. EGD ([facility]) 4/17/25- No esophageal varices. One non-bleeding cratered duodenal ulcer with no bleeding. mild gastritis Labs 4/16/25- wbc 12.7, hgb 7.4, hct 22, plt 101 na 137, cr 1.4, tbil 5, alp 117, ast 57, alt 32 ammonia 48 Received 2 units PRBC CT scan abdomen with contrast- colitis ascending and transverse colon. splenomegaly left kidney cyst. suggest mri 3 months - Ordered antibiotics - needs to start - Taking: lactulose BID, xifaxan bid, vitamin d, and nadolol 20 mg  Now - BM 2 times per day- small amount - Stools firm - denies melena, no dark stools - No n/v, heartburn, dysphagia,  - No abdominal - last etoh 6/2024  5/15/25- Laura Jewell NP - 60 yo female here to follow-up cirrhosis - Referred to [facility], consult scheduled tomorrow with Dr. Nguyen - Having a lot of fatigue - No alcohol - Last dental visit 8/2024- will schedule cleaning today - Needs colonoscopy and EGD - No recent mammogram - Taking: D3 2000 u, Lactulose 2-3 doses until 2-3 good BM,nadolol 20 mg daily, xifaxan bid, pantoprazole 40 mg daily  6/10/2025- Laura Jewell NP - 60 yo female present for telehealth to follow-up cirrhosis - Undergoing work-up with [facility]- with Dr. Dr. Thayer - Next visit with [facility] tomorrow.  Awaiting imaging. Per patient was told should be approved  - Taking: D3 2000 u, Lactulose 2-3 doses until 2-3 good BM,nadolol 20 mg daily, xifaxan bid, pantoprazole 40 mg daily - 5/23/2025 colonoscopy ([facility])- - Internal hemorrhoids. Diverticulosis in the recto-sigmoid colon. The examined portion of the ileum was normal. No specimens collected. Repeat 10 years - 5/23/2025 EGD ([facility])- - Small (< 5 mm) esophageal varices. Gastritis, characterized by erythema. Biopsied,  inactive chronic gastritis. Normal examined duodenum. 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

## 2025-07-16 NOTE — HPI-TODAY'S VISIT:
7/16/25- Laura Jewell, NP - 59 yo female here for scheduled follow-up cirrhosis - Meeting scheduled with [facility] on 7/18 and records sent to [place]  - Current smoker: admits to 2 CIGS per day - [facility] 7/4- rhinovirus and colitis.  Tx with Cipro and metronidazole - Antibiotics finished and BM improving - Jaundice improving  - Taking: pantoprazole 40 mg daily, Xifaxan bid, lactulose qid, nadolol 20 mg daily

## 2025-07-20 ENCOUNTER — WEB ENCOUNTER (OUTPATIENT)
Dept: URBAN - METROPOLITAN AREA CLINIC 98 | Facility: CLINIC | Age: 60
End: 2025-07-20

## 2025-07-20 RX ORDER — PANTOPRAZOLE 40 MG/1
1 TABLET TABLET, DELAYED RELEASE ORAL ONCE A DAY
Qty: 30 TABLET | Refills: 5

## 2025-07-20 RX ORDER — RIFAXIMIN 550 MG/1
1 TABLET TABLET ORAL TWICE A DAY
Qty: 180 TABLET | Refills: 5
Start: 2024-08-09 | End: 2027-01-11

## 2025-07-20 RX ORDER — NADOLOL 20 MG/1
1 TABLET TABLET ORAL ONCE A DAY
Qty: 90 TABLET | Refills: 1
Start: 2024-08-09

## 2025-08-18 ENCOUNTER — OFFICE VISIT (OUTPATIENT)
Dept: URBAN - METROPOLITAN AREA CLINIC 98 | Facility: CLINIC | Age: 60
End: 2025-08-18

## 2025-08-27 ENCOUNTER — WEB ENCOUNTER (OUTPATIENT)
Dept: URBAN - METROPOLITAN AREA CLINIC 98 | Facility: CLINIC | Age: 60
End: 2025-08-27